# Patient Record
Sex: MALE | Race: WHITE | Employment: FULL TIME | URBAN - METROPOLITAN AREA
[De-identification: names, ages, dates, MRNs, and addresses within clinical notes are randomized per-mention and may not be internally consistent; named-entity substitution may affect disease eponyms.]

---

## 2017-04-20 ENCOUNTER — OFFICE VISIT (OUTPATIENT)
Dept: INTERNAL MEDICINE CLINIC | Age: 37
End: 2017-04-20

## 2017-04-20 VITALS
TEMPERATURE: 97.6 F | OXYGEN SATURATION: 97 % | HEIGHT: 70 IN | WEIGHT: 189 LBS | SYSTOLIC BLOOD PRESSURE: 128 MMHG | BODY MASS INDEX: 27.06 KG/M2 | DIASTOLIC BLOOD PRESSURE: 76 MMHG | HEART RATE: 72 BPM

## 2017-04-20 DIAGNOSIS — J30.9 ALLERGIC RHINOCONJUNCTIVITIS OF BOTH EYES: ICD-10-CM

## 2017-04-20 DIAGNOSIS — J45.20 MILD INTERMITTENT ASTHMA WITHOUT COMPLICATION: Primary | ICD-10-CM

## 2017-04-20 DIAGNOSIS — G47.33 OSA (OBSTRUCTIVE SLEEP APNEA): ICD-10-CM

## 2017-04-20 DIAGNOSIS — Z00.00 PREVENTATIVE HEALTH CARE: ICD-10-CM

## 2017-04-20 DIAGNOSIS — K51.919 ULCERATIVE COLITIS WITH COMPLICATION, UNSPECIFIED LOCATION (HCC): ICD-10-CM

## 2017-04-20 DIAGNOSIS — H10.13 ALLERGIC RHINOCONJUNCTIVITIS OF BOTH EYES: ICD-10-CM

## 2017-04-20 DIAGNOSIS — Z72.0 TOBACCO ABUSE DISORDER: ICD-10-CM

## 2017-04-20 PROBLEM — J30.89 PERENNIAL ALLERGIC RHINITIS: Status: ACTIVE | Noted: 2017-04-20

## 2017-04-20 LAB
ALBUMIN SERPL-MCNC: 4.6 G/DL (ref 3.4–5)
ALP BLD-CCNC: 46 U/L (ref 40–129)
ALT SERPL-CCNC: 23 U/L (ref 10–40)
ANION GAP SERPL CALCULATED.3IONS-SCNC: 12 MMOL/L (ref 3–16)
AST SERPL-CCNC: 20 U/L (ref 15–37)
BASOPHILS ABSOLUTE: 0.1 K/UL (ref 0–0.2)
BASOPHILS RELATIVE PERCENT: 1.2 %
BILIRUB SERPL-MCNC: 0.3 MG/DL (ref 0–1)
BILIRUBIN DIRECT: <0.2 MG/DL (ref 0–0.3)
BILIRUBIN, INDIRECT: NORMAL MG/DL (ref 0–1)
BUN BLDV-MCNC: 30 MG/DL (ref 7–20)
C-REACTIVE PROTEIN: <0.3 MG/L (ref 0–5.1)
CALCIUM SERPL-MCNC: 9.2 MG/DL (ref 8.3–10.6)
CHLORIDE BLD-SCNC: 102 MMOL/L (ref 99–110)
CHOLESTEROL, TOTAL: 212 MG/DL (ref 0–199)
CO2: 28 MMOL/L (ref 21–32)
CREAT SERPL-MCNC: 0.7 MG/DL (ref 0.9–1.3)
EOSINOPHILS ABSOLUTE: 0.5 K/UL (ref 0–0.6)
EOSINOPHILS RELATIVE PERCENT: 7 %
GFR AFRICAN AMERICAN: >60
GFR NON-AFRICAN AMERICAN: >60
GLUCOSE BLD-MCNC: 82 MG/DL (ref 70–99)
HCT VFR BLD CALC: 42.8 % (ref 40.5–52.5)
HDLC SERPL-MCNC: 56 MG/DL (ref 40–60)
HEMOGLOBIN: 14.1 G/DL (ref 13.5–17.5)
IRON SATURATION: 13 % (ref 20–50)
IRON: 45 UG/DL (ref 59–158)
LDL CHOLESTEROL CALCULATED: 140 MG/DL
LYMPHOCYTES ABSOLUTE: 2.1 K/UL (ref 1–5.1)
LYMPHOCYTES RELATIVE PERCENT: 31.4 %
MCH RBC QN AUTO: 28.4 PG (ref 26–34)
MCHC RBC AUTO-ENTMCNC: 33.1 G/DL (ref 31–36)
MCV RBC AUTO: 86 FL (ref 80–100)
MONOCYTES ABSOLUTE: 0.6 K/UL (ref 0–1.3)
MONOCYTES RELATIVE PERCENT: 9.1 %
NEUTROPHILS ABSOLUTE: 3.4 K/UL (ref 1.7–7.7)
NEUTROPHILS RELATIVE PERCENT: 51.3 %
PDW BLD-RTO: 14.1 % (ref 12.4–15.4)
PHOSPHORUS: 3.9 MG/DL (ref 2.5–4.9)
PLATELET # BLD: 284 K/UL (ref 135–450)
PMV BLD AUTO: 7.7 FL (ref 5–10.5)
POTASSIUM SERPL-SCNC: 4.3 MMOL/L (ref 3.5–5.1)
RBC # BLD: 4.97 M/UL (ref 4.2–5.9)
SEDIMENTATION RATE, ERYTHROCYTE: 7 MM/HR (ref 0–15)
SODIUM BLD-SCNC: 142 MMOL/L (ref 136–145)
T4 FREE: 1.1 NG/DL (ref 0.9–1.8)
TOTAL IRON BINDING CAPACITY: 342 UG/DL (ref 260–445)
TOTAL PROTEIN: 7.2 G/DL (ref 6.4–8.2)
TRIGL SERPL-MCNC: 82 MG/DL (ref 0–150)
VLDLC SERPL CALC-MCNC: 16 MG/DL
WBC # BLD: 6.7 K/UL (ref 4–11)

## 2017-04-20 PROCEDURE — 90471 IMMUNIZATION ADMIN: CPT | Performed by: INTERNAL MEDICINE

## 2017-04-20 PROCEDURE — 90732 PPSV23 VACC 2 YRS+ SUBQ/IM: CPT | Performed by: INTERNAL MEDICINE

## 2017-04-20 PROCEDURE — 99204 OFFICE O/P NEW MOD 45 MIN: CPT | Performed by: INTERNAL MEDICINE

## 2017-04-20 RX ORDER — ALBUTEROL SULFATE 90 UG/1
2 AEROSOL, METERED RESPIRATORY (INHALATION) EVERY 4 HOURS PRN
Qty: 2 INHALER | Refills: 5 | Status: SHIPPED | OUTPATIENT
Start: 2017-04-20

## 2017-04-20 RX ORDER — AZELASTINE HYDROCHLORIDE 0.5 MG/ML
1 SOLUTION/ DROPS OPHTHALMIC 2 TIMES DAILY
Qty: 6 ML | Refills: 5 | Status: SHIPPED | OUTPATIENT
Start: 2017-04-20 | End: 2018-07-31

## 2017-04-20 RX ORDER — FLUTICASONE PROPIONATE 50 MCG
1 SPRAY, SUSPENSION (ML) NASAL DAILY
Qty: 1 BOTTLE | Refills: 3 | Status: SHIPPED | OUTPATIENT
Start: 2017-04-20 | End: 2018-07-31

## 2017-04-20 RX ORDER — MELOXICAM 15 MG/1
15 TABLET ORAL DAILY PRN
Qty: 90 TABLET | Refills: 1 | Status: SHIPPED | OUTPATIENT
Start: 2017-04-20 | End: 2018-07-31

## 2017-04-20 RX ORDER — ACETAMINOPHEN 500 MG
1000 TABLET ORAL EVERY 6 HOURS PRN
Qty: 225 TABLET | Refills: 3 | Status: SHIPPED | OUTPATIENT
Start: 2017-04-20

## 2017-04-21 DIAGNOSIS — R79.0 LOW IRON STORES: Primary | ICD-10-CM

## 2017-04-21 LAB
ESTIMATED AVERAGE GLUCOSE: 114 MG/DL
FERRITIN: 27 NG/ML (ref 30–400)
HBA1C MFR BLD: 5.6 %
TSH SERPL DL<=0.05 MIU/L-ACNC: 1.32 UIU/ML (ref 0.27–4.2)

## 2017-04-21 RX ORDER — FERROUS SULFATE 300 MG/5ML
300 LIQUID (ML) ORAL 2 TIMES DAILY
Qty: 300 ML | Refills: 3 | Status: SHIPPED | OUTPATIENT
Start: 2017-04-21 | End: 2018-07-31

## 2017-06-16 ENCOUNTER — TELEPHONE (OUTPATIENT)
Dept: SLEEP MEDICINE | Age: 37
End: 2017-06-16

## 2017-07-24 ENCOUNTER — TELEPHONE (OUTPATIENT)
Dept: INTERNAL MEDICINE CLINIC | Age: 37
End: 2017-07-24

## 2017-07-28 DIAGNOSIS — D22.9 ATYPICAL MOLE: Primary | ICD-10-CM

## 2017-10-18 ENCOUNTER — OFFICE VISIT (OUTPATIENT)
Dept: INTERNAL MEDICINE CLINIC | Age: 37
End: 2017-10-18

## 2017-10-18 VITALS
RESPIRATION RATE: 16 BRPM | HEART RATE: 72 BPM | WEIGHT: 206 LBS | SYSTOLIC BLOOD PRESSURE: 114 MMHG | HEIGHT: 70 IN | BODY MASS INDEX: 29.49 KG/M2 | DIASTOLIC BLOOD PRESSURE: 76 MMHG

## 2017-10-18 DIAGNOSIS — K51.011 ULCERATIVE PANCOLITIS WITH RECTAL BLEEDING (HCC): Primary | ICD-10-CM

## 2017-10-18 DIAGNOSIS — J45.20 MILD INTERMITTENT ASTHMA WITHOUT COMPLICATION: ICD-10-CM

## 2017-10-18 PROCEDURE — 99214 OFFICE O/P EST MOD 30 MIN: CPT | Performed by: INTERNAL MEDICINE

## 2017-10-18 PROCEDURE — 90686 IIV4 VACC NO PRSV 0.5 ML IM: CPT | Performed by: INTERNAL MEDICINE

## 2017-10-18 PROCEDURE — 90471 IMMUNIZATION ADMIN: CPT | Performed by: INTERNAL MEDICINE

## 2017-10-18 RX ORDER — PREDNISONE 20 MG/1
40 TABLET ORAL DAILY
Qty: 14 TABLET | Refills: 0 | Status: SHIPPED | OUTPATIENT
Start: 2017-10-18

## 2017-10-18 RX ORDER — CIPROFLOXACIN 250 MG/1
250 TABLET, FILM COATED ORAL 2 TIMES DAILY
Qty: 14 TABLET | Refills: 0 | Status: SHIPPED | OUTPATIENT
Start: 2017-10-18 | End: 2017-10-25

## 2017-10-18 RX ORDER — METRONIDAZOLE 500 MG/1
500 TABLET ORAL 2 TIMES DAILY
Qty: 14 TABLET | Refills: 0 | Status: SHIPPED | OUTPATIENT
Start: 2017-10-18 | End: 2017-10-25

## 2017-10-18 ASSESSMENT — ENCOUNTER SYMPTOMS
RESPIRATORY NEGATIVE: 1
ABDOMINAL PAIN: 1
NAUSEA: 1
SHORTNESS OF BREATH: 0
ABDOMINAL DISTENTION: 0
DIARRHEA: 1
VOMITING: 1
COUGH: 0
BLOOD IN STOOL: 1

## 2017-10-18 NOTE — PROGRESS NOTES
Vaccine Information Sheet, \"Influenza - Inactivated\"  given to Chago Carroll, or parent/legal guardian of  Chago Carroll and verbalized understanding. Patient responses:    Have you ever had a reaction to a flu vaccine? No  Are you able to eat eggs without adverse effects? Yes  Do you have any current illness? No  Have you ever had Guillian [place] Syndrome? No    Flu vaccine given per order. Please see immunization tab.

## 2017-10-18 NOTE — PROGRESS NOTES
(Site: Left Arm, Position: Sitting, Cuff Size: Large Adult)   Pulse 72 Comment: Regular  Resp 16   Ht 5' 10\" (1.778 m)   Wt 206 lb (93.4 kg)   BMI 29.56 kg/m²     Assessment:           ICD-10-CM ICD-9-CM    1. Ulcerative pancolitis with rectal bleeding (HCC) K51.011 556.6 C DIFF TOXIN/ANTIGEN      Miscellaneous Sendout 1      TISSUE CULTURE      EIA ANTIGEN TEST STOOL #1      GI Bacterial Pathogens By PCR   2.  Mild intermittent asthma without complication I58.50 941.71       UC with acute exacerbation      Asthma well controlled       Plan:       steroid burst and antibiotics   check stools   encourage to resume the ASA and f/u with GI  Vaccinate today    Orders Placed This Encounter   Procedures    C DIFF TOXIN/ANTIGEN    TISSUE CULTURE    GI Bacterial Pathogens By PCR    INFLUENZA, QUADV, 3 YRS AND OLDER, IM, PF, PREFILL SYR OR SDV, 0.5ML (FLUZONE QUADV, PF)    Miscellaneous Sendout 1    EIA ANTIGEN TEST STOOL #1       Current Outpatient Prescriptions   Medication Sig Dispense Refill    predniSONE (DELTASONE) 20 MG tablet Take 2 tablets by mouth daily 14 tablet 0    ciprofloxacin (CIPRO) 250 MG tablet Take 1 tablet by mouth 2 times daily for 7 days 14 tablet 0    metroNIDAZOLE (FLAGYL) 500 MG tablet Take 1 tablet by mouth 2 times daily for 7 days 14 tablet 0    mesalamine (PENTASA) 250 MG extended release capsule Take 2 capsules by mouth 4 times daily 360 capsule 3    Budesonide 9 MG TB24 Take 9 mg by mouth daily 30 tablet 2    albuterol sulfate  (90 BASE) MCG/ACT inhaler Inhale 2 puffs into the lungs every 4 hours as needed for Wheezing or Shortness of Breath Use inhaler with a spacer device 2 Inhaler 5    Spacer/Aero-Holding Chambers (E-Z SPACER) SALIMA 1 Device by Does not apply route daily as needed (shortness of breath) 1 Device 0    acetaminophen (TYLENOL) 500 MG tablet Take 2 tablets by mouth every 6 hours as needed for Pain 225 tablet 3    ferrous sulfate 300 (60 FE) MG/5ML syrup Take 5 mLs by mouth 2 times daily 300 mL 3    fluticasone (FLONASE) 50 MCG/ACT nasal spray 1 spray by Nasal route daily 1 Bottle 3    azelastine (OPTIVAR) 0.05 % ophthalmic solution Place 1 drop into both eyes 2 times daily 6 mL 5    meloxicam (MOBIC) 15 MG tablet Take 1 tablet by mouth daily as needed for Pain 90 tablet 1     No current facility-administered medications for this visit.         AVS and education print provided    Og Chanel  10/18/2017

## 2017-10-19 DIAGNOSIS — K51.011 ULCERATIVE PANCOLITIS WITH RECTAL BLEEDING (HCC): ICD-10-CM

## 2017-10-19 LAB — C DIFFICILE TOXIN, EIA: NORMAL

## 2017-10-20 LAB
CRYPTOSPORIDIUM ANTIGEN STOOL: NORMAL
E HISTOLYTICA ANTIGEN STOOL: NORMAL
GI BACTERIAL PATHOGENS BY PCR: NORMAL
GIARDIA ANTIGEN STOOL: NORMAL

## 2017-10-20 RX ORDER — BUDESONIDE 3 MG/1
3 CAPSULE, COATED PELLETS ORAL 3 TIMES DAILY
Qty: 90 CAPSULE | Refills: 3 | Status: SHIPPED | OUTPATIENT
Start: 2017-10-20 | End: 2018-07-31

## 2018-04-02 ENCOUNTER — HOSPITAL ENCOUNTER (OUTPATIENT)
Dept: OTHER | Age: 38
Discharge: HOME | End: 2018-04-02
Attending: INTERNAL MEDICINE | Admitting: INTERNAL MEDICINE

## 2018-04-02 DIAGNOSIS — Z01.818 PREOP TESTING: ICD-10-CM

## 2018-07-31 ENCOUNTER — OFFICE VISIT (OUTPATIENT)
Dept: PRIMARY CARE CLINIC | Age: 38
End: 2018-07-31

## 2018-07-31 VITALS
WEIGHT: 205 LBS | BODY MASS INDEX: 29.35 KG/M2 | SYSTOLIC BLOOD PRESSURE: 106 MMHG | TEMPERATURE: 98.1 F | HEART RATE: 74 BPM | OXYGEN SATURATION: 96 % | DIASTOLIC BLOOD PRESSURE: 61 MMHG | HEIGHT: 70 IN

## 2018-07-31 DIAGNOSIS — J30.89 PERENNIAL ALLERGIC RHINITIS: ICD-10-CM

## 2018-07-31 DIAGNOSIS — F17.211 CIGARETTE NICOTINE DEPENDENCE IN REMISSION: ICD-10-CM

## 2018-07-31 DIAGNOSIS — K51.90 ULCERATIVE COLITIS WITHOUT COMPLICATIONS, UNSPECIFIED LOCATION (HCC): Primary | ICD-10-CM

## 2018-07-31 DIAGNOSIS — R73.09 ELEVATED GLUCOSE: ICD-10-CM

## 2018-07-31 LAB — HBA1C MFR BLD: 5.5 %

## 2018-07-31 PROCEDURE — 83036 HEMOGLOBIN GLYCOSYLATED A1C: CPT | Performed by: FAMILY MEDICINE

## 2018-07-31 PROCEDURE — 99203 OFFICE O/P NEW LOW 30 MIN: CPT | Performed by: FAMILY MEDICINE

## 2018-07-31 RX ORDER — CETIRIZINE HYDROCHLORIDE 10 MG/1
10 TABLET ORAL DAILY
COMMUNITY

## 2018-07-31 RX ORDER — INFLIXIMAB 100 MG/10ML
5 INJECTION, POWDER, LYOPHILIZED, FOR SOLUTION INTRAVENOUS SEE ADMIN INSTRUCTIONS
COMMUNITY

## 2018-07-31 ASSESSMENT — PATIENT HEALTH QUESTIONNAIRE - PHQ9
4. FEELING TIRED OR HAVING LITTLE ENERGY: 3
2. FEELING DOWN, DEPRESSED OR HOPELESS: 3
3. TROUBLE FALLING OR STAYING ASLEEP: 0
5. POOR APPETITE OR OVEREATING: 3
SUM OF ALL RESPONSES TO PHQ QUESTIONS 1-9: 17
6. FEELING BAD ABOUT YOURSELF - OR THAT YOU ARE A FAILURE OR HAVE LET YOURSELF OR YOUR FAMILY DOWN: 3
9. THOUGHTS THAT YOU WOULD BE BETTER OFF DEAD, OR OF HURTING YOURSELF: 0
SUM OF ALL RESPONSES TO PHQ9 QUESTIONS 1 & 2: 6
1. LITTLE INTEREST OR PLEASURE IN DOING THINGS: 3
10. IF YOU CHECKED OFF ANY PROBLEMS, HOW DIFFICULT HAVE THESE PROBLEMS MADE IT FOR YOU TO DO YOUR WORK, TAKE CARE OF THINGS AT HOME, OR GET ALONG WITH OTHER PEOPLE: 3
7. TROUBLE CONCENTRATING ON THINGS, SUCH AS READING THE NEWSPAPER OR WATCHING TELEVISION: 2
8. MOVING OR SPEAKING SO SLOWLY THAT OTHER PEOPLE COULD HAVE NOTICED. OR THE OPPOSITE, BEING SO FIGETY OR RESTLESS THAT YOU HAVE BEEN MOVING AROUND A LOT MORE THAN USUAL: 0

## 2018-07-31 ASSESSMENT — ENCOUNTER SYMPTOMS
TROUBLE SWALLOWING: 0
EYE PAIN: 0
SHORTNESS OF BREATH: 0
VOICE CHANGE: 0
ANAL BLEEDING: 0
COUGH: 0
PHOTOPHOBIA: 0
EYE ITCHING: 0
SINUS PRESSURE: 0
CHOKING: 0
EYE REDNESS: 0
CHEST TIGHTNESS: 0
NAUSEA: 0
BACK PAIN: 0
VOMITING: 0
COLOR CHANGE: 0
SORE THROAT: 0
WHEEZING: 0
APNEA: 0
ABDOMINAL PAIN: 0
CONSTIPATION: 0
FACIAL SWELLING: 0
RECTAL PAIN: 0
BLOOD IN STOOL: 0
EYE DISCHARGE: 0

## 2018-07-31 NOTE — PROGRESS NOTES
Subjective:      Patient ID: Holly Mckeon is a 45 y.o. male. The chief complaint(s)  New pt for for UC, hay fever, elevated glucose  HPI40 y/o male new to practice. Known UC for 3 years presented with diarrhea onset. Currently on prednisone and remicade . No current diarrhea, wt loss, diarrhea, fever, appetite change. No fever. No cough. No assoc sxs  No current aggravating factors  Last colonoscopy 2 months ago . GI Dr Flaco Cooper, Dr Kasi Montalvo        Review of Systems   Constitutional: Negative for activity change, appetite change, chills, diaphoresis, fatigue, fever and unexpected weight change. HENT: Negative for congestion, dental problem, drooling, ear discharge, ear pain, facial swelling, hearing loss, mouth sores, nosebleeds, postnasal drip, sinus pressure, sneezing, sore throat, tinnitus, trouble swallowing and voice change. Eyes: Negative for photophobia, pain, discharge, redness, itching and visual disturbance. Respiratory: Negative for apnea, cough, choking, chest tightness, shortness of breath and wheezing. Cardiovascular: Negative for chest pain, palpitations and leg swelling. Gastrointestinal: Negative for abdominal pain, anal bleeding, blood in stool, constipation, nausea, rectal pain and vomiting. Genitourinary: Negative for decreased urine volume, difficulty urinating, discharge, dysuria, enuresis, flank pain, frequency, hematuria, penile swelling, scrotal swelling, testicular pain and urgency. Musculoskeletal: Negative for arthralgias, back pain, gait problem, joint swelling, myalgias, neck pain and neck stiffness. Skin: Negative for color change, pallor, rash and wound. Neurological: Negative for dizziness, tremors, seizures, syncope, facial asymmetry, speech difficulty, weakness, light-headedness, numbness and headaches. Hematological: Negative for adenopathy. Does not bruise/bleed easily.    Psychiatric/Behavioral: Negative for agitation, behavioral problems, confusion, decreased concentration, dysphoric mood, hallucinations, self-injury, sleep disturbance and suicidal ideas. The patient is not nervous/anxious and is not hyperactive. Objective:   Physical Exam   Constitutional: He is oriented to person, place, and time. He appears well-developed and well-nourished. No distress. HENT:   Head: Normocephalic and atraumatic. Right Ear: External ear normal.   Left Ear: External ear normal.   Nose: Nose normal.   Mouth/Throat: Oropharynx is clear and moist. No oropharyngeal exudate. Eyes: Conjunctivae and EOM are normal. Pupils are equal, round, and reactive to light. Right eye exhibits no discharge. Left eye exhibits no discharge. No scleral icterus. Neck: Normal range of motion. Neck supple. No JVD present. No tracheal deviation present. No thyromegaly present. Cardiovascular: Normal rate, regular rhythm, normal heart sounds and intact distal pulses. Exam reveals no friction rub. No murmur heard. Pulses:       Carotid pulses are 2+ on the right side, and 2+ on the left side. Radial pulses are 2+ on the right side, and 2+ on the left side. Femoral pulses are 2+ on the right side, and 2+ on the left side. Popliteal pulses are 2+ on the right side, and 2+ on the left side. Dorsalis pedis pulses are 2+ on the right side, and 2+ on the left side. Posterior tibial pulses are 2+ on the right side, and 2+ on the left side. Pulmonary/Chest: Effort normal and breath sounds normal. No stridor. No respiratory distress. He has no wheezes. He has no rales. He exhibits no tenderness. Abdominal: Soft. Bowel sounds are normal. He exhibits no distension and no mass. There is no tenderness. There is no rebound and no guarding. Musculoskeletal: Normal range of motion. He exhibits no edema or tenderness. Lymphadenopathy:     He has no cervical adenopathy. Neurological: He is oriented to person, place, and time. He displays normal reflexes.  No cranial nerve deficit. He exhibits normal muscle tone. Coordination normal.   Skin: Skin is warm and dry. No rash noted. He is not diaphoretic. No pallor. Psychiatric: He has a normal mood and affect. His behavior is normal. Judgment and thought content normal.   Nursing note and vitals reviewed. Assessment:      1. Ulcerative colitis without complications, unspecified location ([address]  Seems stable with prednisone 20 mg , remicade  Say Dr Laith Buchanan  8/13/19  Plans to taper by 1 mg a week  Tolerating  Xeljanc  5 mg bid [facility] 3/19  Update colonoscopy       2. Perennial allergic rhinitis  Stable with cetrizine    3. Cigarette nicotine dependence in remission  The patient is currently a smoker. Risks of smoking and second hand smoking risks discussed  With patient. Products available for smoking cessation have been discussed with patient. Says UC worse when he tries to stop    4.  Elevated glucose    - POCT glycosylated hemoglobin (Hb A1C)          Plan:      2/18/19    Seen notes from Dr Laith Buchanan MD  Dated  2/15/19 on pred 17.5 mg a day up to 10 loose bm when taking <.  Likely will start Sisi Sher

## 2018-10-11 ENCOUNTER — TELEPHONE (OUTPATIENT)
Dept: PRIMARY CARE CLINIC | Age: 38
End: 2018-10-11

## 2018-10-12 DIAGNOSIS — K51.919 ULCERATIVE COLITIS WITH COMPLICATION, UNSPECIFIED LOCATION (HCC): Primary | ICD-10-CM

## 2022-03-19 ENCOUNTER — OFFICE VISIT (OUTPATIENT)
Dept: ORTHOPEDIC SURGERY | Age: 42
End: 2022-03-19

## 2022-03-19 DIAGNOSIS — M79.662 PAIN OF LEFT CALF: Primary | ICD-10-CM

## 2022-03-19 PROCEDURE — 99203 OFFICE O/P NEW LOW 30 MIN: CPT | Performed by: STUDENT IN AN ORGANIZED HEALTH CARE EDUCATION/TRAINING PROGRAM

## 2022-03-19 NOTE — PROGRESS NOTES
CHIEF COMPLAINT: Left ankle pain. DATE OF INJURY: 3/17/22    History:  Mr. Randolph Seip 39 y.o. male presents today to after hours clinic for evaluation of a left calf injury. The injury occurred 2 days ago when he was jumping over a wave in [place]. When he landed he felt immediate calf pain. He is complaining of medial gastrocnemius pain. There is no pain at the achilles. He has not yet been evaluated for the pain. He [place] the airport as he can hardly bear weight on the left leg. He rates pain 9/10. Pain increases with walking and decreases with rest and elevation. No numbness or tingling sensation, and no other complaints. He has been using tylenol for pain control as he cannot take NSAID's due to history of ulcerative colitis. He presents today non weight bearing on crutches. Past Medical History:   Diagnosis Date    Asthma     UC (ulcerative colitis confined to rectum) ([facility] [address]        No past surgical history on file. Current Outpatient Medications on File Prior to Visit   Medication Sig Dispense Refill    inFLIXimab (REMICADE) 100 MG injection Infuse 5 mg/kg intravenously See Admin Instructions      cetirizine (ZYRTEC) 10 MG tablet Take 10 mg by mouth daily      predniSONE (DELTASONE) 20 MG tablet Take 2 tablets by mouth daily 14 tablet 0    albuterol sulfate  (90 BASE) MCG/ACT inhaler Inhale 2 puffs into the lungs every 4 hours as needed for Wheezing or Shortness of Breath Use inhaler with a spacer device 2 Inhaler 5    acetaminophen (TYLENOL) 500 MG tablet Take 2 tablets by mouth every 6 hours as needed for Pain 225 tablet 3     No current facility-administered medications on file prior to visit.        Allergies   Allergen Reactions    Naproxen     Penicillins Other (See Comments)    Zithromax [Azithromycin]        Social History     Socioeconomic History    Marital status:      Spouse name: Not on file    Number of children: Not on file    Years of education: Not on file    Highest education level: Not on file   Occupational History    Not on file   Tobacco Use    Smoking status: Current Every Day Smoker     Packs/day: 1.00     Years: 15.00     Pack years: 15.00     Types: Cigarettes, E-Cigarettes    Smokeless tobacco: Never Used   Substance and Sexual Activity    Alcohol use: Yes     Comment: rarely    Drug use: Yes     Comment: 7yrs ago or more    Sexual activity: Yes     Partners: Female     Birth control/protection: Pill   Other Topics Concern    Not on file   Social History Narrative    Not on file     Social Determinants of Health     Financial Resource Strain:     Difficulty of Paying Living Expenses: Not on file   Food Insecurity:     Worried About Running Out of Food in the Last Year: Not on file    Halle of Food in the Last Year: Not on file   Transportation Needs:     Lack of Transportation (Medical): Not on file    Lack of Transportation (Non-Medical): Not on file   Physical Activity:     Days of Exercise per Week: Not on file    Minutes of Exercise per Session: Not on file   Stress:     Feeling of Stress : Not on file   Social Connections:     Frequency of Communication with Friends and Family: Not on file    Frequency of Social Gatherings with Friends and Family: Not on file    Attends Sikhism Services: Not on file    Active Member of [address] [place] [address] [employer] or Organizations: Not on file    Attends Club or Organization Meetings: Not on file    Marital Status: Not on file   Intimate Partner Violence:     Fear of Current or Ex-Partner: Not on file    Emotionally Abused: Not on file    Physically Abused: Not on file    Sexually Abused: Not on file   Housing Stability:     Unable to Pay for Housing in the Last Year: Not on file    Number of [place] in the Last Year: Not on file    Unstable Housing in the Last Year: Not on file       No family history on file.     Review of Systems:  I have reviewed the clinically relevant past medical history, medications, allergies, family history, social history, and 13 point Review of Systems from the patient's recent history form & documented any details relevant to today's presenting complaints in the history above. The patient's self-reported past medical history, medications, allergies, family history, social history, and Review of Systems form from 3/19/22 have been scanned into the chart under the \"Media\" tab. Physical Examination:  Mr. Sharron Myers is a 39 y.o. male who presents today in no acute distress, awake, alert, and oriented. Examination of left lower extremity demonstrates no obvious deformity. Left ankle ROM 20 (dorsiflexion) - 40 (plantarflexion), right ankle 20-50. There is minimal swelling that can be seen in left medial gastrocnemius, no swelling at the ankle. No change in the color left ankle. He has intact sensation to light touch throughout the foot and good pedal pulses bilaterally. He has good strength in dorsiflexion, eversion, inversion. Plantarflexion strength is decreased. There is discomfort with palpation at the medial gastrocnemius. There is a palpable defect at the medial gastrocnemius. Cristobal test is positive on the left ankle, and negative on the right ankle. Matles sign is negative on the left ankle, and negative on the right ankle. IMAGIN views of the Left tibia fibula demonstrate no acute abnormality or fracture. There is lucency in the soft tissue of the gastrocnemius that correlates to the defect on physical exam.        Impression:   Left medial gastrocnemius partial vs complete tear      Plan:   The diagnosis was discussed with the patient. Given the mechanism of injury, palpable defect on exam of the medial gastrocnemius and weakness on exam, I feel that an MRI of the left calf is warranted to evaluate for tear of the medial gastrocnemius. Patient will continue with non weight bearing on crutches. Ice as needed. Follow up with me or Dr. Eduardo Perez after MRI. Nava Ellis PA-C  Board Certified by the [employer] on Certification of [address] [facility] and [facility]ics       Disclaimer: This note was generated with use of a verbal recognition program and an attempt was made to check for errors. It is possible that there are still dictated errors within this office note. If so, please bring any significant errors to my attention for an addendum. All efforts were made to ensure that this office note is accurate.

## 2022-03-22 ENCOUNTER — TELEPHONE (OUTPATIENT)
Dept: ORTHOPEDIC SURGERY | Age: 42
End: 2022-03-22

## 2022-03-22 DIAGNOSIS — M79.662 PAIN OF LEFT CALF: Primary | ICD-10-CM

## 2022-03-22 NOTE — TELEPHONE ENCOUNTER
LVM MRI denied (reasoning not medically necessary). Patient can start physical therapy. If no improvement after 6 weeks we can resubmit for MRI.

## 2022-03-23 ENCOUNTER — TELEPHONE (OUTPATIENT)
Dept: ORTHOPEDIC SURGERY | Age: 42
End: 2022-03-23

## 2022-03-23 NOTE — TELEPHONE ENCOUNTER
[facility] [place] Disability parking placard for the following reasons: He has limited walking ability due to an orthopedic condition. Duration of need: 6 months  Placed in patient chart      Patient can start physical therapy.  If no improvement after 6 weeks we can resubmit for MR    [address] [facility] Therapy   [address] [facility] [address] [facility] , [address]   Call to schedule: [phone]   Fax: [phone]

## 2022-03-23 NOTE — LETTER
[facility]  [address] [facility] [address]  Phone: [phone]  Fax: [phone]    Calin Pierce         March 23, 2022     Patient: Caitlyn Tanner   YOB: 1980   Date of Visit: 3/23/2022       To Whom It May Concern: It is my medical opinion that Caitlyn Tanner requires a disability parking placard for the following reasons:  He has limited walking ability due to an orthopedic condition. Duration of need: 6 months    If you have any questions or concerns, please don't hesitate to call.     Sincerely,        BILL Pierce

## 2022-03-23 NOTE — TELEPHONE ENCOUNTER
General Question     Subject: LT CALF  Patient and /or Facility Request: Phil Rebecca Number: [phone]    PATIENT CALLING REGARDING HE WAS IN AFTER HOURS ON Saturday, MARCH 19, 2022. PATIENT STATED THAT HE HAS A LEFT CALF MUSCLE TEAR. PATIENT WOULD LIKE TO KNOW IF HE CAN GET A HANDICAP PLACARD. PLEASE CALL BACK PATIENT AT THE ABOVE NUMBER.

## 2022-03-24 ENCOUNTER — HOSPITAL ENCOUNTER (OUTPATIENT)
Dept: PHYSICAL THERAPY | Age: 42
Setting detail: THERAPIES SERIES
Discharge: HOME | End: 2022-03-24
Payer: COMMERCIAL

## 2022-03-24 PROCEDURE — 97530 THERAPEUTIC ACTIVITIES: CPT | Performed by: PHYSICAL THERAPIST

## 2022-03-24 PROCEDURE — 97161 PT EVAL LOW COMPLEX 20 MIN: CPT | Performed by: PHYSICAL THERAPIST

## 2022-03-24 NOTE — PLAN OF CARE
[place], [address] [facility], [address]  Phone: [phone]   Fax: [phone]                                                       Physical Therapy Certification    Dear Referring Practitioner: BILL Vance,    We had the pleasure of evaluating the following patient for physical therapy services at [address] [place] [address]. A summary of our findings can be found in the initial assessment below. This includes our plan of care. If you have any questions or concerns regarding these findings, please do not hesitate to contact me at the office phone number checked above. Thank you for the referral.       Physician Signature:_______________________________Date:__________________  By signing above (or electronic signature), therapists plan is approved by physician      Patient: Nayla Barrientos   : 1980   MRN: [id number]  Referring Physician: Referring Practitioner: BILL Vance      Evaluation Date: 3/24/2022      Medical Diagnosis Information:  Diagnosis: B92.178 (ICD-10-CM) - Pain of left calf   Treatment Diagnosis: Impairments of L calf pain, LLE strength, L knee and ankle ROM, gait, balance, and proprioception                                         Insurance information: PT Insurance Information: Med [place]     Precautions/ Contra-indications: none  Latex Allergy:  [x]NO      []YES  Preferred Language for Healthcare:   [x]English       []Other:    C-SSRS Triggered by Intake questionnaire (Past 2 wk assessment ):   [x] No, Questionnaire did not trigger screening.   [] Yes, Patient intake triggered C-SSRS Screening     [] Completed, no further action required.    [] Completed, PCP notified via Epic    SUBJECTIVE: Patient stated complaint: Pt is a 40 yo male presenting with L calf pain; states he was walking into the ocean and tried to jump over a wave, Hamstrings 4+ 4+   Ankle Dorsiflexion (L4-5) 5 5   Ankle Plantarflexion (S1-2)     Ankle Inversion     Ankle Eversion (S1-2) 5 5   Great Toe Extension (L5) 5 5            Joint mobility:    [x]Normal    []Hypo   []Hyper    Orthopaedic Special Tests Positive  Negative  NT Comments           Ankle       Anterior Drawer   x    Talar Tilt   x    Cristobal  x     Virginie's    x                                  [x] Patient history, allergies, meds reviewed. Medical chart reviewed. See intake form. Review Of Systems (ROS):  [x]Performed Review of systems (Integumentary, CardioPulmonary, Neurological) by intake and observation. Intake form has been scanned into medical record. Patient has been instructed to contact their primary care physician regarding ROS issues if not already being addressed at this time.       Co-morbidities/Complexities (which will affect course of rehabilitation):   []None        []Hx of COVID   Arthritic conditions   []Rheumatoid arthritis (M05.9)  []Osteoarthritis (M19.91)  []Gout   Cardiovascular conditions   []Hypertension (I10)  []Hyperlipidemia (E78.5)  []Angina pectoris (I20)  []Atherosclerosis (I70)  []Pacemaker  []Hx of CABG/stent/  cardiac surgeries   Musculoskeletal conditions   []Disc pathology   []Congenital spine pathologies   []Osteoporosis (M81.8)  []Osteopenia (M85.8)  []Scoliosis       Endocrine conditions   []Hypothyroid (E03.9)  []Hyperthyroid Gastrointestinal conditions   []Constipation (J22.75)   Metabolic conditions   []Morbid obesity (E66.01)  []Diabetes type 1(E10.65) or 2 (E11.65)   []Neuropathy (G60.9)     Cardio/Pulmonary conditions   []Asthma (J45)  []Coughing   []COPD (J44.9)  []CHF  []A-fib   Psychological Disorders  [x]Anxiety (F41.9)  [x]Depression (F32.9)   []Other:   Developmental Disorders  []Autism (F84.0)  []CP (G80)  []Down Syndrome (Q90.9)  []Developmental delay     Neurological conditions  []Prior Stroke (I69.30)  []Parkinson's (G20)  []Encephalopathy (G93.40)  []MS (G35)  []Post-polio (G14)  []SCI  []TBI  []ALS Other conditions  []Fibromyalgia (M79.7)  []Vertigo  []Syncope  []Kidney Failure  []Cancer      []currently undergoing                treatment  []Pregnancy  []Incontinence   Prior surgeries  []involved limb  []previous spinal surgery  [] section birth  []hysterectomy  []bowel / bladder surgery  []other relevant surgeries   []Other:              Barriers to/and or personal factors that will affect rehab potential:              [x]Age  []Sex    []Smoker              []Motivation/Lack of Motivation                        []Co-Morbidities              []Cognitive Function, education/learning barriers              [x]Environmental, home barriers              [x]profession/work barriers  []past PT/medical experience  []other:  Justification:     Falls Risk Assessment (30 days):   [x] Falls Risk assessed and no intervention required.   [] Falls Risk assessed and Patient requires intervention due to being higher risk   TUG score (>12s at risk):     [] Falls education provided, including        ASSESSMENT:   Functional Impairments:     []Noted lumbar/proximal hip/LE joint hypomobility   [x]Decreased LE functional ROM   [x]Decreased core/proximal hip strength and neuromuscular control   [x]Decreased LE functional strength   [x]Reduced balance/proprioceptive control   []other:      Functional Activity Limitations (from functional questionnaire and intake)   [x]Reduced ability to tolerate prolonged functional positions   [x]Reduced ability or difficulty with changes of positions or transfers between positions   [x]Reduced ability to maintain good posture and demonstrate good body mechanics with sitting, bending, and lifting   [x]Reduced ability to sleep   [x] Reduced ability or tolerance with driving and/or computer work   [x]Reduced ability to perform lifting, carrying tasks   [x]Reduced ability to squat   [x]Reduced ability to forward bend   [x]Reduced ability to ambulate prolonged functional periods/distances/surfaces   [x]Reduced ability to ascend/descend stairs   [x]Reduced ability to run, hop, cut or jump   []other:    Participation Restrictions   [x]Reduced participation in self care activities   [x]Reduced participation in home management activities   [x]Reduced participation in work activities   [x]Reduced participation in social activities. [x]Reduced participation in sport/recreation activities. Classification :    []Signs/symptoms consistent with post-surgical status including decreased ROM, strength and function.    [x]Signs/symptoms consistent with joint sprain/strain   []Signs/symptoms consistent with patella-femoral syndrome   []Signs/symptoms consistent with knee OA/hip OA   []Signs/symptoms consistent with internal derangement of knee/Hip   []Signs/symptoms consistent with functional hip weakness/NMR control      []Signs/symptoms consistent with tendinitis/tendinosis    []signs/symptoms consistent with pathology which may benefit from Dry needling      []other:      Prognosis/Rehab Potential:      []Excellent   [x]Good    []Fair   []Poor    Tolerance of evaluation/treatment:    []Excellent   [x]Good    []Fair   []Poor    Physical Therapy Evaluation Complexity Justification  [x] A history of present problem with:  [] no personal factors and/or comorbidities that impact the plan of care;  [x]1-2 personal factors and/or comorbidities that impact the plan of care  []3 personal factors and/or comorbidities that impact the plan of care  [x] An examination of body systems using standardized tests and measures addressing any of the following: body structures and functions (impairments), activity limitations, and/or participation restrictions;:  [] a total of 1-2 or more elements   [] a total of 3 or more elements   [x] a total of 4 or more elements   [x] A clinical presentation with:  [x] stable and/or uncomplicated characteristics   [] evolving clinical presentation with changing characteristics  [] unstable and unpredictable characteristics;   [x] Clinical decision making of [x] Low, [] moderate, [] high complexity using standardized patient assessment instrument and/or measurable assessment of functional outcome. [x] EVAL (LOW) 55718 (typically 15 minutes face-to-face)  [] EVAL (MOD) 21603 (typically 30 minutes face-to-face)  [] EVAL (HIGH) 52934 (typically 45 minutes face-to-face)  [] RE-EVAL     PLAN:   Frequency/Duration:  1-2 days per week for 10-12 Weeks:  Interventions:  [x]  Therapeutic exercise including: strength training, ROM, for Lower extremity and core   [x]  NMR activation and proprioception for LE, Glutes and Core   [x]  Manual therapy as indicated for LE, Hip and spine to include: Dry Needling/IASTM, STM, PROM, Gr I-IV mobilizations, manipulation. [x] Modalities as needed that may include: thermal agents, E-stim, Biofeedback, US, iontophoresis as indicated  [x] Patient education on joint protection, postural re-education, activity modification, progression of HEP. HEP instruction: Written HEP instructions provided and reviewed. GOALS:  Patient stated goal: return to jogging  [] Progressing: [] Met: [] Not Met: [] Adjusted    Therapist goals for Patient:   Short Term Goals: To be achieved in: 2 weeks  1. Independent in HEP and progression per patient tolerance, in order to prevent re-injury. [] Progressing: [] Met: [] Not Met: [] Adjusted  2. Patient will have a decrease in pain to facilitate improvement in movement, function, and ADLs as indicated by Functional Deficits. [] Progressing: [] Met: [] Not Met: [] Adjusted    Long Term Goals: To be achieved in: 10-12 weeks  1. FOTO raw score of 70 or greater to assist with reaching prior level of function. [] Progressing: [] Met: [] Not Met: [] Adjusted  2.  Patient will demonstrate increased L ankle dorsiflexion AROM to 12 to allow for proper joint functioning as indicated by patients Functional Deficits. [] Progressing: [] Met: [] Not Met: [] Adjusted  3. Patient will demonstrate an increase in global LLE Strength to at least 4+/5 as well as good proximal hip strength and control to allow for proper functional mobility as indicated by patients Functional Deficits. [] Progressing: [] Met: [] Not Met: [] Adjusted  4. Patient will return to functional activities including ambulating with normalized gait pattern without crutches without increased symptoms or restriction. [] Progressing: [] Met: [] Not Met: [] Adjusted  5. Patient will begin return to jogging progression demonstrating ability to jog 20 minutes continuously without an increase in symptoms. [] Progressing: [] Met: [] Not Met: [] Adjusted     Electronically signed by:  GIUSEPPE Farris SPT  Therapist was present, directed the patient's care, made skilled judgement, and was responsible for assessment and treatment of the patient.

## 2022-03-24 NOTE — FLOWSHEET NOTE
[facility]  Phone: [phone]   Fax: [phone]    Physical Therapy Treatment Note/ Progress Report:     Date:  3/24/2022    Patient Name:  Kiersten Hanson    :  1980  MRN: [id number]  Restrictions/Precautions:    Medical/Treatment Diagnosis Information:  Diagnosis: M79.662 (ICD-10-CM) - Pain of left calf  Treatment Diagnosis: Impairments of L calf pain, LLE strength, L knee and ankle ROM, gait, balance, and proprioception  Insurance/Certification information:  PT Insurance Information: [employer] [place]  Physician Information:  Referring Practitioner: BILL Hudson  Plan of care signed (Y/N): []  Yes [x]  No     Date of Patient follow up with Physician:      Progress Report: []  Yes  [x]  No     Date Range for reporting period:  Beginning: 3/24/22  Ending:     Progress report due (10 Rx/or 30 days whichever is less): visit #10 or  (date)     Recertification due (POC duration/ or 90 days whichever is less): visit #24 or 22 (date)     Visit # Insurance Allowable Auth required?  Date Range   1 20 []  Yes  [x]  No        Latex Allergy:  [x]NO      []YES  Preferred Language for Healthcare:   [x]English       []other:    Functional Scale:        Date assessed:  FOTO: raw score = 31; risk adjusted = 53   3/24/22    Pain level:  2/10; 8/10 at worst     SUBJECTIVE:  See eval    OBJECTIVE: See eval      RESTRICTIONS/PRECAUTIONS: ambulating with single crutch    Exercises/Interventions:     Therapeutic Exercise (66011)  Resistance / level Sets/sec Reps Notes / Cues   Dorsiflexion stretch  10\" 10    Seated heel raises  1 15 Instructed to perform within painfree range                                                           Therapeutic Activities (39488)       Education provided for crutch fitting and ambulating with crutches  10'                          Neuromuscular Re-ed (04241)                            Manual Intervention ([id number]) Knee mobs/PROM       Tib/Fem Mobs       Patella Mobs       Ankle mobs                         Modalities:     Pt. Education:  -pt educated on diagnosis, prognosis and expectations for rehab  -all pt questions were answered    Home Exercise Program:  Access Code: [facility]  URL: [email or web address]  Date: 03/24/2022  Prepared by: Naldo Cantu    Exercises  Seated Heel Raise - 2-3 x daily - 7 x weekly - 3 sets - 10 reps  Long Sitting Calf Stretch with Strap - 2-3 x daily - 7 x weekly - 3 sets - 10 reps  Sidelying Hip Abduction - 2-3 x daily - 7 x weekly - 3 sets - 10 reps  Standing Quad Set - 2-3 x daily - 7 x weekly - 3 sets - 10 reps  Seated Hamstring Stretch - 2-3 x daily - 7 x weekly - 3 sets - 10 reps      Therapeutic Exercise and NMR EXR  [] (54562) Provided verbal/tactile cueing for activities related to strengthening, flexibility, endurance, ROM for improvements in LE, proximal hip, and core control with self care, mobility, lifting, ambulation.  [] (07573) Provided verbal/tactile cueing for activities related to improving balance, coordination, kinesthetic sense, posture, motor skill, proprioception  to assist with LE, proximal hip, and core control in self care, mobility, lifting, ambulation and eccentric single leg control.   [] (67926) Therapist is in constant attendance of 2 or more patients providing skilled therapy interventions, but not providing any significant amount of measurable one-on-one time to either patient, for improvements in LE, proximal hip, and core control in self care, mobility, lifting, ambulation and eccentric single leg control.      NMR and Therapeutic Activities:    [x] (71142 or 63093) Provided verbal/tactile cueing for activities related to improving balance, coordination, kinesthetic sense, posture, motor skill, proprioception and motor activation to allow for proper function of core, proximal hip and LE with self care and ADLs  [] (23202) Gait Re-education- Provided training and instruction to the patient for proper LE, core and proximal hip recruitment and positioning and eccentric body weight control with ambulation re-education including up and down stairs     Home Exercise Program:    [x] (53254) Reviewed/Progressed HEP activities related to strengthening, flexibility, endurance, ROM of core, proximal hip and LE for functional self-care, mobility, lifting and ambulation/stair navigation   [] (46829)Reviewed/Progressed HEP activities related to improving balance, coordination, kinesthetic sense, posture, motor skill, proprioception of core, proximal hip and LE for self care, mobility, lifting, and ambulation/stair navigation      Manual Treatments:  PROM / STM / Oscillations-Mobs:  G-I, II, III, IV (PA's, Inf., Post.)  [] (37219) Provided manual therapy to mobilize LE, proximal hip and/or LS spine soft tissue/joints for the purpose of modulating pain, promoting relaxation,  increasing ROM, reducing/eliminating soft tissue swelling/inflammation/restriction, improving soft tissue extensibility and allowing for proper ROM for normal function with self care, mobility, lifting and ambulation. Modalities:  [] (06760) Vasopneumatic compression: Utilized vasopneumatic compression to decrease edema / swelling for the purpose of improving mobility and quad tone / recruitment which will allow for increased overall function including but not limited to self-care, transfers, ambulation, and ascending / descending stairs.        Charges:  Timed Code Treatment Minutes: 15   Total Treatment Minutes: 45     [x] EVAL - LOW (19969)   [] EVAL - MOD (64044)  [] EVAL - HIGH (61110)  [] RE-EVAL (21264)   [] JL(99989) x       [] Ionto  [] NMR (87317) x       [] Vaso  [] Manual (76128) x       [] Ultrasound  [x] TA x 1       [] Mech Traction (82019)  [] Aquatic Therapy x      [] ES (un) (98657):   [] Home Management Training x  [] ES(attended) (92065)   [] Dry Needling 1-2 muscles (34043):  [] Dry Needling 3+ muscles (589400  [] Group:      [] Other:     GOALS:  Patient stated goal: return to jogging  [] Progressing: [] Met: [] Not Met: [] Adjusted    Therapist goals for Patient:   Short Term Goals: To be achieved in: 2 weeks  1. Independent in HEP and progression per patient tolerance, in order to prevent re-injury. [] Progressing: [] Met: [] Not Met: [] Adjusted  2. Patient will have a decrease in pain to facilitate improvement in movement, function, and ADLs as indicated by Functional Deficits. [] Progressing: [] Met: [] Not Met: [] Adjusted    Long Term Goals: To be achieved in: 10-12 weeks  1. FOTO raw score of 70 or greater to assist with reaching prior level of function. [] Progressing: [] Met: [] Not Met: [] Adjusted  2. Patient will demonstrate increased L ankle dorsiflexion AROM to 12 to allow for proper joint functioning as indicated by patients Functional Deficits. [] Progressing: [] Met: [] Not Met: [] Adjusted  3. Patient will demonstrate an increase in global LLE Strength to at least 4+/5 as well as good proximal hip strength and control to allow for proper functional mobility as indicated by patients Functional Deficits. [] Progressing: [] Met: [] Not Met: [] Adjusted  4. Patient will return to functional activities including ambulating with normalized gait pattern without crutches without increased symptoms or restriction. [] Progressing: [] Met: [] Not Met: [] Adjusted  5. Patient will begin return to jogging progression demonstrating ability to jog 20 minutes continuously without an increase in symptoms. [] Progressing: [] Met: [] Not Met: [] Adjusted     Overall Progression Towards Functional goals/ Treatment Progress Update:  [] Patient is progressing as expected towards functional goals listed. [] Progression is slowed due to complexities/Impairments listed. [] Progression has been slowed due to co-morbidities.   [x] Plan just implemented, too soon to assess goals progression <30days   [] Goals require adjustment due to lack of progress  [] Patient is not progressing as expected and requires additional follow up with physician  [] Other    Persisting Functional Limitations/Impairments:  [x]Sitting [x]Standing   [x]Walking [x]Stairs   [x]Transfers [x]ADLs   [x]Squatting/bending [x]Kneeling  [x]Housework [x]Job related tasks  []Driving [x]Sports/Recreation   [x]Sleeping []Other:    ASSESSMENT:  See eval  Treatment/Activity Tolerance:  [] Pt able to complete treatment [] Patient limited by fatique  [x] Patient limited by pain  [] Patient limited by other medical complications  [] Other:     Prognosis:  [x] Good [] Fair  [] Poor    Patient Requires Follow-up: [x] Yes  [] No    Return to Play:    [x]  N/A   []  Stage 1: Intro to Strength   []  Stage 2: Return to Run and Strength   []  Stage 3: Return to Jump and Strength   []  Stage 4: Dynamic Strength and Agility   []  Stage 5: Sport Specific Training     []  Ready to Return to Play, Meets All Above Stages   []  Not Ready for Return to Sports   Comments:            PLAN: See eval. PT 1-2x / week for 10-12 weeks. [] Continue per plan of care [] Alter current plan (see comments)  [x] Plan of care initiated [] Hold pending MD visit [] Discharge    Electronically signed by: Nael Rodríguez, PT, DPT    IRWIN Traore    Note: If patient does not return for scheduled/ recommended follow up visits, this note will serve as a discharge from care along with most recent update on progress.

## 2022-03-30 ENCOUNTER — HOSPITAL ENCOUNTER (OUTPATIENT)
Dept: PHYSICAL THERAPY | Age: 42
Setting detail: THERAPIES SERIES
Discharge: HOME | End: 2022-03-30
Payer: COMMERCIAL

## 2022-03-30 PROCEDURE — 97112 NEUROMUSCULAR REEDUCATION: CPT

## 2022-03-30 PROCEDURE — 97110 THERAPEUTIC EXERCISES: CPT

## 2022-03-30 NOTE — FLOWSHEET NOTE
[facility]  Phone: [phone]   Fax: [phone]    Physical Therapy Treatment Note/ Progress Report:     Date:  3/30/2022    Patient Name:  Gloria Hendrickson    :  1980  MRN: [id number]  Restrictions/Precautions:    Medical/Treatment Diagnosis Information:  Diagnosis: M79.662 (ICD-10-CM) - Pain of left calf  Treatment Diagnosis: Impairments of L calf pain, LLE strength, L knee and ankle ROM, gait, balance, and proprioception  Insurance/Certification information:  PT Insurance Information: [employer] [place]  Physician Information:  Referring Practitioner: BILL Seo  Plan of care signed (Y/N): []  Yes [x]  No     Date of Patient follow up with Physician:      Progress Report: []  Yes  [x]  No     Date Range for reporting period:  Beginning: 3/24/22  Ending:     Progress report due (10 Rx/or 30 days whichever is less): visit #10 or  (date)     Recertification due (POC duration/ or 90 days whichever is less): visit #24 or 22 (date)     Visit # Insurance Allowable Auth required? Date Range   2 20 []  Yes  [x]  No        Latex Allergy:  [x]NO      []YES  Preferred Language for Healthcare:   [x]English       []other:    Functional Scale:        Date assessed:  FOTO: raw score = 31; risk adjusted = 53   3/24/22    Pain level: 3/10;      SUBJECTIVE:  Pt reports good response to initial visit. Notes good compliance with HEP. Pt rates L gastroc pain 3-4/10 currently. OBJECTIVE: Added ex's emphasizing L LE flexibility, strength, endurance, balance, stability and controlled LE mobility.         PROM AROM     L R L R   Knee Flexion     120 132   Knee Extension     0 0   Dorsiflexion      -2 16   Plantarflexion  50     48   Inversion      38 38   Eversion      12 14           RESTRICTIONS/PRECAUTIONS: ambulating with single crutch    Exercises/Interventions:     Therapeutic Exercise (34042)  Resistance / level Sets/sec Reps Notes / Cues Dorsiflexion stretch  10\" 10    Seated heel/toe raises  3 10ea. Instructed to perform within painfree range   Strap calf stretch  3 30\"    SL hip abd  3 10    Long sit HS stretch  3 30\"    Standing Quad set  1/5\"hold 20    Bike  5min     Runner stretch  3 30\"                  Therapeutic Activities (94561)       Education provided for crutch fitting and ambulating with crutches                            Neuromuscular Re-ed (27393)       Standing March  2 10    BAPS (L2) standing PWB: s-s/f-b/CW/CCW  1 20ea. Manual Intervention ([id number])       Knee mobs/PROM       Tib/Fem Mobs       Patella Mobs       Ankle mobs                         Modalities:     Pt. Education:  -pt educated on diagnosis, prognosis and expectations for rehab  -all pt questions were answered    Home Exercise Program:  Access Code: [email or web address]  URL: [email or web address]  Date: 03/24/2022  Prepared by: Nevaeh Sutherland    Exercises  Seated Heel Raise - 2-3 x daily - 7 x weekly - 3 sets - 10 reps  Long Sitting Calf Stretch with Strap - 2-3 x daily - 7 x weekly - 3 sets - 10 reps  Sidelying Hip Abduction - 2-3 x daily - 7 x weekly - 3 sets - 10 reps  Standing Quad Set - 2-3 x daily - 7 x weekly - 3 sets - 10 reps  Seated Hamstring Stretch - 2-3 x daily - 7 x weekly - 3 sets - 10 reps      Therapeutic Exercise and NMR EXR  [x] (31647) Provided verbal/tactile cueing for activities related to strengthening, flexibility, endurance, ROM for improvements in LE, proximal hip, and core control with self care, mobility, lifting, ambulation.   [x] (02447) Provided verbal/tactile cueing for activities related to improving balance, coordination, kinesthetic sense, posture, motor skill, proprioception  to assist with LE, proximal hip, and core control in self care, mobility, lifting, ambulation and eccentric single leg control.   [] (51691) Therapist is in constant attendance of 2 or more patients providing skilled therapy interventions, but not including but not limited to self-care, transfers, ambulation, and ascending / descending stairs. Charges:  Timed Code Treatment Minutes: 45   Total Treatment Minutes: 50     [] EVAL - LOW (94459)   [] EVAL - MOD (55461)  [] EVAL - HIGH (38385)  [] RE-EVAL (52561)   [x] JL(49867) x 2      [] Ionto  [x] NMR (42977) x 1       [] Vaso  [] Manual (84546) x       [] Ultrasound  [] TA x 1       [] Mech Traction (14959)  [] Aquatic Therapy x      [] ES (un) (53050):   [] Home Management Training x  [] ES(attended) (75428)   [] Dry Needling 1-2 muscles (38670):  [] Dry Needling 3+ muscles (523031  [] Group:      [] Other:     GOALS:  Patient stated goal: return to jogging  [] Progressing: [] Met: [] Not Met: [] Adjusted    Therapist goals for Patient:   Short Term Goals: To be achieved in: 2 weeks  1. Independent in HEP and progression per patient tolerance, in order to prevent re-injury. [] Progressing: [] Met: [] Not Met: [] Adjusted  2. Patient will have a decrease in pain to facilitate improvement in movement, function, and ADLs as indicated by Functional Deficits. [] Progressing: [] Met: [] Not Met: [] Adjusted    Long Term Goals: To be achieved in: 10-12 weeks  1. FOTO raw score of 70 or greater to assist with reaching prior level of function. [] Progressing: [] Met: [] Not Met: [] Adjusted  2. Patient will demonstrate increased L ankle dorsiflexion AROM to 12 to allow for proper joint functioning as indicated by patients Functional Deficits. [] Progressing: [] Met: [] Not Met: [] Adjusted  3. Patient will demonstrate an increase in global LLE Strength to at least 4+/5 as well as good proximal hip strength and control to allow for proper functional mobility as indicated by patients Functional Deficits. [] Progressing: [] Met: [] Not Met: [] Adjusted  4.  Patient will return to functional activities including ambulating with normalized gait pattern without crutches without increased symptoms or restriction. [] Progressing: [] Met: [] Not Met: [] Adjusted  5. Patient will begin return to jogging progression demonstrating ability to jog 20 minutes continuously without an increase in symptoms. [] Progressing: [] Met: [] Not Met: [] Adjusted     Overall Progression Towards Functional goals/ Treatment Progress Update:  [] Patient is progressing as expected towards functional goals listed. [] Progression is slowed due to complexities/Impairments listed. [] Progression has been slowed due to co-morbidities. [x] Plan just implemented, too soon to assess goals progression <30days   [] Goals require adjustment due to lack of progress  [] Patient is not progressing as expected and requires additional follow up with physician  [] Other    Persisting Functional Limitations/Impairments:  [x]Sitting [x]Standing   [x]Walking [x]Stairs   [x]Transfers [x]ADLs   [x]Squatting/bending [x]Kneeling  [x]Housework [x]Job related tasks  []Driving [x]Sports/Recreation   [x]Sleeping []Other:    ASSESSMENT:  Pt tolerated ex. Additions well without aggravating L gastroc/calf. Pt noted less calf tightness and discomfort following today's session. Pt's active L ankle DF has increased 4deg from initial measurement however, remains 2 deg from neutral and is limited by gastroc discomfort and tightness. Pt is ambulating (I) without AD exhibiting mild limp through L stance phase and decreased DF from MS-HO. Pt demonstrated fair quality of L LL/ankle mobility through prescribed ex's. Pt required cueing to perform ex's with correct technique. Reviewed, discussed current HEP and proper gait mechanics, pt demonstrated good understanding.     Treatment/Activity Tolerance:  [x] Pt able to complete treatment [] Patient limited by fatique  [x] Patient limited by pain  [] Patient limited by other medical complications  [] Other:     Prognosis:  [x] Good [] Fair  [] Poor    Patient Requires Follow-up: [x] Yes  [] No    Return to Play:    [x] N/A   []  Stage 1: Intro to Strength   []  Stage 2: Return to Run and Strength   []  Stage 3: Return to Jump and Strength   []  Stage 4: Dynamic Strength and Agility   []  Stage 5: Sport Specific Training     []  Ready to Return to Play, Meets All Above Stages   []  Not Ready for Return to Sports   Comments:            PLAN: See eval. PT 1-2x / week for 10-12 weeks. [x] Continue per plan of care [] Alter current plan (see comments)  [] Plan of care initiated [] Hold pending MD visit [] Discharge    Electronically signed by: Randell Fajardo, PTA. [id number]        Note: If patient does not return for scheduled/ recommended follow up visits, this note will serve as a discharge from care along with most recent update on progress.

## 2022-04-07 ENCOUNTER — OFFICE VISIT (OUTPATIENT)
Dept: ORTHOPEDIC SURGERY | Age: 42
End: 2022-04-07
Payer: COMMERCIAL

## 2022-04-07 ENCOUNTER — HOSPITAL ENCOUNTER (OUTPATIENT)
Dept: PHYSICAL THERAPY | Age: 42
Setting detail: THERAPIES SERIES
Discharge: HOME | End: 2022-04-07

## 2022-04-07 VITALS — BODY MASS INDEX: 29.35 KG/M2 | HEIGHT: 70 IN | WEIGHT: 205 LBS

## 2022-04-07 DIAGNOSIS — S86.112A STRAIN OF GASTROCNEMIUS MUSCLE OF LEFT LOWER EXTREMITY, INITIAL ENCOUNTER: Primary | ICD-10-CM

## 2022-04-07 PROCEDURE — L4361 PNEUMA/VAC WALK BOOT PRE OTS: HCPCS | Performed by: ORTHOPAEDIC SURGERY

## 2022-04-07 PROCEDURE — G8427 DOCREV CUR MEDS BY ELIG CLIN: HCPCS | Performed by: ORTHOPAEDIC SURGERY

## 2022-04-07 PROCEDURE — G8419 CALC BMI OUT NRM PARAM NOF/U: HCPCS | Performed by: ORTHOPAEDIC SURGERY

## 2022-04-07 PROCEDURE — 99213 OFFICE O/P EST LOW 20 MIN: CPT | Performed by: ORTHOPAEDIC SURGERY

## 2022-04-07 PROCEDURE — 1036F TOBACCO NON-USER: CPT | Performed by: ORTHOPAEDIC SURGERY

## 2022-04-07 RX ORDER — LAMOTRIGINE 25 MG/1
50 TABLET ORAL NIGHTLY
COMMUNITY
Start: 2022-03-22

## 2022-04-07 RX ORDER — LOPERAMIDE HYDROCHLORIDE 2 MG/1
2 CAPSULE ORAL
COMMUNITY

## 2022-04-07 RX ORDER — BUPROPION HYDROCHLORIDE 150 MG/1
150 TABLET ORAL EVERY MORNING
COMMUNITY
Start: 2022-03-22

## 2022-04-07 NOTE — FLOWSHEET NOTE
[address] [facility]    Physical Therapy  Cancellation/No-show Note  Patient Name:  Ana Chambers  :  1980   Date:  2022    Cancelled visits to date: 1  No-shows to date: 0    For today's appointment patient:  [x]  Cancelled  []  Rescheduled appointment  []  No-show     Reason given by patient:  []  Patient ill  []  Conflicting appointment  []  No transportation    []  Conflict with work  []  No reason given  [x]  Other:   Cancelled by PT   Comments:  Pt. Into therapy session this date with significant increase in pain and difficulty walking. Upon inspection pt. Was noted to have increased effusion, tightness and discoloration throughout L calf. Pt. Had MD appointment immediately following therapy session, therefore the decided to hold on therapy until following MD appointment. Pt. Was taken to MD office upstairs and MD staff notified of status change. Will plan to hold on therapy pending MD direction. Phone call information:   []  Phone call made today to patient at _ time at number provided:      []  Patient answered, conversation as follows:    []  Patient did not answer, message left as follows:  []  Phone call not made today  []  Phone call not needed - pt contacted us to cancel and provided reason for cancellation.      Electronically signed by:  Carolyn Mccurdy PT

## 2022-05-12 ENCOUNTER — OFFICE VISIT (OUTPATIENT)
Dept: ORTHOPEDIC SURGERY | Age: 42
End: 2022-05-12
Payer: COMMERCIAL

## 2022-05-12 VITALS — WEIGHT: 200 LBS | BODY MASS INDEX: 28.63 KG/M2 | HEIGHT: 70 IN

## 2022-05-12 DIAGNOSIS — S86.112D STRAIN OF GASTROCNEMIUS MUSCLE OF LEFT LOWER EXTREMITY, SUBSEQUENT ENCOUNTER: Primary | ICD-10-CM

## 2022-05-12 PROCEDURE — G8419 CALC BMI OUT NRM PARAM NOF/U: HCPCS | Performed by: ORTHOPAEDIC SURGERY

## 2022-05-12 PROCEDURE — G8427 DOCREV CUR MEDS BY ELIG CLIN: HCPCS | Performed by: ORTHOPAEDIC SURGERY

## 2022-05-12 PROCEDURE — 99213 OFFICE O/P EST LOW 20 MIN: CPT | Performed by: ORTHOPAEDIC SURGERY

## 2022-05-12 PROCEDURE — 1036F TOBACCO NON-USER: CPT | Performed by: ORTHOPAEDIC SURGERY

## 2022-05-12 NOTE — PROGRESS NOTES
CHIEF COMPLAINT: Left ankle pain. DATE OF INJURY: 3/17/22    History:  Mr. Ayo Sanchez 39 y.o. male presents today for left ankle follow-up. Initial history: to after hours clinic for evaluation of a left calf injury. The injury occurred 2 days ago when he was jumping over a wave in [place]. When he landed he felt immediate calf pain. He is complaining of medial gastrocnemius pain. There is no pain at the achilles. He has not yet been evaluated for the pain. He [place] the airport as he can hardly bear weight on the left leg. He rates pain 9/10. Pain increases with walking and decreases with rest and elevation. No numbness or tingling sensation, and no other complaints. He has been using tylenol for pain control as he cannot take NSAID's due to history of ulcerative colitis. He presents today non weight bearing on crutches. Interval History: He states his calf is feeling much better. He only wore the boot for 2 weeks. Most the time his leg feels okay. He feels like his calf still has a deformity, more so that is still swollen. Past Medical History:   Diagnosis Date    Asthma     UC (ulcerative colitis confined to rectum) ([facility] [address]        History reviewed. No pertinent surgical history.     Current Outpatient Medications on File Prior to Visit   Medication Sig Dispense Refill    buPROPion (WELLBUTRIN XL) 150 MG extended release tablet Take 150 mg by mouth every morning      lamoTRIgine (LAMICTAL) 25 MG tablet Take 50 mg by mouth nightly      loperamide (IMODIUM) 2 MG capsule Take 2 mg by mouth      Tofacitinib Citrate 10 MG TABS Take 10 mg by mouth      inFLIXimab (REMICADE) 100 MG injection Infuse 5 mg/kg intravenously See Admin Instructions      cetirizine (ZYRTEC) 10 MG tablet Take 10 mg by mouth daily      predniSONE (DELTASONE) 20 MG tablet Take 2 tablets by mouth daily 14 tablet 0    albuterol sulfate  (90 BASE) MCG/ACT inhaler Inhale 2 puffs into the lungs every 4 hours as needed for Wheezing or Shortness of Breath Use inhaler with a spacer device 2 Inhaler 5    acetaminophen (TYLENOL) 500 MG tablet Take 2 tablets by mouth every 6 hours as needed for Pain 225 tablet 3     No current facility-administered medications on file prior to visit. Allergies   Allergen Reactions    Naproxen     Penicillins Other (See Comments)    Zithromax [Azithromycin]        Social History     Socioeconomic History    Marital status:      Spouse name: Not on file    Number of children: Not on file    Years of education: Not on file    Highest education level: Not on file   Occupational History    Not on file   Tobacco Use    Smoking status: Former Smoker     Packs/day: 1.00     Years: 15.00     Pack years: 15.00     Types: Cigarettes, E-Cigarettes     Quit date: 2014     Years since quittin.1    Smokeless tobacco: Never Used   Vaping Use    Vaping Use: Every day    Substances: Nicotine, Flavoring   Substance and Sexual Activity    Alcohol use: Yes     Comment: rarely    Drug use: Yes     Comment: 7yrs ago or more    Sexual activity: Yes     Partners: Female     Birth control/protection: Pill   Other Topics Concern    Not on file   Social History Narrative    Not on file     Social Determinants of Health     Financial Resource Strain:     Difficulty of Paying Living Expenses: Not on file   Food Insecurity:     Worried About [address] [employer] in the Last Year: Not on file    [facility] in the Last Year: Not on file   Transportation Needs:     Lack of Transportation (Medical): Not on file    Lack of Transportation (Non-Medical):  Not on file   Physical Activity:     Days of Exercise per Week: Not on file    Minutes of Exercise per Session: Not on file   Stress:     Feeling of Stress : Not on file   Social Connections:     Frequency of Communication with Friends and Family: Not on file    Frequency of Social Gatherings with Friends and Family: Not on file    Attends Christian Services: Not on file    Active Member of Clubs or Organizations: Not on file    Attends Club or Organization Meetings: Not on file    Marital Status: Not on file   Intimate Partner Violence:     Fear of Current or Ex-Partner: Not on file    Emotionally Abused: Not on file    Physically Abused: Not on file    Sexually Abused: Not on file   Housing Stability:     Unable to Pay for Housing in the Last Year: Not on file    Number of [place] in the Last Year: Not on file    Unstable Housing in the Last Year: Not on file       History reviewed. No pertinent family history. Review of Systems:  I have reviewed the clinically relevant past medical history, medications, allergies, family history, social history, and 13 point Review of Systems from the patient's recent history form & documented any details relevant to today's presenting complaints in the history above. The patient's self-reported past medical history, medications, allergies, family history, social history, and Review of Systems form from 3/19/22 have been scanned into the chart under the \"Media\" tab. Physical Examination:  Mr. Amanda Rosenbaum is a 39 y.o. male who presents today in no acute distress, awake, alert, and oriented. Ht 5' 10\" (1.778 m)   Wt 200 lb (90.7 kg)   BMI 28.70 kg/m²       Examination of left lower extremity demonstrates no obvious deformity. Left ankle ROM 30 (dorsiflexion) - 45 (plantarflexion), right ankle 30-50. There is mild to moderate swelling that can be seen in left medial gastrocnemius, no swelling at the ankle. He has good strength in dorsiflexion, eversion, inversion. Plantarflexion strength is decreased. There is no tenderness with palpation at the medial gastrocnemius. I cannot feel a palpable defect, though I do possibly feel some scar tissue.     IMAGING:  Left tibia fibula xrays 3/19/22: There is lucency in the soft tissue of the gastrocnemius muscle around mid-belly        Impression:   Left medial gastrocnemius partial vs complete tear      Plan:   Discussed with patient that I do suspect that he had a medial gastroc tear. Ice/heat as needed. Resume PT. Progressive increase in activity as his symptoms resolved    Follow up 2 months as needed            Leonela Lee. Jose Vasquez MD  Orthopaedic Surgery and Sports Medicine     Disclaimer: This note was generated with use of a verbal recognition program and an attempt was made to check for errors. It is possible that there are still dictated errors within this office note. If so, please bring any significant errors to my attention for an addendum. All efforts were made to ensure that this office note is accurate.

## 2022-05-18 ENCOUNTER — HOSPITAL ENCOUNTER (OUTPATIENT)
Dept: PHYSICAL THERAPY | Age: 42
Setting detail: THERAPIES SERIES
Discharge: HOME | End: 2022-05-18
Payer: COMMERCIAL

## 2022-05-18 PROCEDURE — 97164 PT RE-EVAL EST PLAN CARE: CPT

## 2022-05-18 PROCEDURE — 97110 THERAPEUTIC EXERCISES: CPT

## 2022-05-18 PROCEDURE — 97140 MANUAL THERAPY 1/> REGIONS: CPT

## 2022-05-18 NOTE — PLAN OF CARE
Damian Chung  Phone: [phone]   Fax: [phone]    Physical Therapy Re-Certification Plan of Care    Dear  , Dr. Ivone Hui     We had the pleasure of treating the following patient for physical therapy services at [facility]. A summary of our findings can be found in the updated assessment below. This includes our plan of care. If you have any questions or concerns regarding these findings, please do not hesitate to contact me at the office phone number checked above. Thank you for the referral.     Physician Signature:________________________________Date:__________________  By signing above (or electronic signature), therapist's plan is approved by physician                                                       Sub-sections:   ;   ;       Overall Response to Treatment:   []Patient is responding well to treatment and improvement is noted with regards  to goals   []Patient should continue to improve in reasonable time if they continue HEP   []Patient has plateaued and is no longer responding to skilled PT intervention    []Patient is getting worse and would benefit from return to referring MD   []Patient unable to adhere to initial POC   [x]Other:  Pt returns to PT for L calf strain/ tear. Pt continues to have deficits in soft tissue tension, decrease flexibility/ ROM and decreased strength that is affecting his function, gait and balance. Pt will benefit from skilled PT to improve these deficits for return of PLOF. Recommendation:    [x]PT 1-2x / wk for 6 weeks.                []Hold PT, pending MD visit        Physical Therapy Treatment Note/ Progress Report:     Date:  2022    Patient Name:  Darryl Juan    :  1980  MRN: [id number]  Restrictions/Precautions:    Medical/Treatment Diagnosis Information:         F91.348H (ICD-10-CM) - Strain of other muscle(s) and tendon(s) of posterior muscle group at lower leg level, left leg, initial encounter  Treatment Diagnosis: Impairments of L calf pain with muscle tension, , L ankle ROM, gait, balance. Insurance/Certification information:  PT Insurance Information: [employer] [place]  Physician Information:  Referring Practitioner:  Dr. Adriel Hdez of care signed (Y/N): []  Yes [x]  No     Date of Patient follow up with Physician:      Progress Report: []  Yes  [x]  No     Date Range for reporting period:  Beginning: 3/24/22  Re-eval: 5/18    Progress report due (10 Rx/or 30 days whichever is less): 8/79/21    Recertification due (POC duration/ or 90 days whichever is less):  7/2/22    Visit # Insurance Allowable Auth required? Date Range   2+1 20 []  Yes  [x]  No 2022       Latex Allergy:  [x]NO      []YES  Preferred Language for Healthcare:   [x]English       []other:    Functional Scale:        Date assessed:  FOTO: raw score = 31; risk adjusted = 53   3/24/22  FOTO = raw score = 63; risk adjusted = 65                      5/18/22    Pain level: 0/10;      SUBJECTIVE:  Pt reports no pain now but has lack of mobility, cramping after 8 hour work day. Wants to get back to being active. Pt goal is to be close to 100% by the time he goes to [facility] June 11th. Pt was in boot for 2 weeks which helped. OBJECTIVE: Added ex's emphasizing L LE flexibility, strength, endurance, balance, stability and controlled LE mobility. 5/18   PROM AROM     L R L R   Knee Flexion     WNL wnl   Knee Extension     0 0   Dorsiflexion      -5 15   Plantarflexion     40  50   Inversion      40 40   Eversion      15 17   5/18 - no TTP, tight gastroc with adhesion/nodules noted in muscle belly, pes planus with SLS on L with decreased ankle/foot control.        RESTRICTIONS/PRECAUTIONS: WBAT    Exercises/Interventions:     Therapeutic Exercise (62611)  Resistance / level Sets/sec Reps Notes / Cues   bike       IB        hss       Heel raises        Arch raises Therapeutic Activities (99115)                                   Neuromuscular Re-ed (41805)       airex       BAPS board       bosu                                    Manual Intervention (85559)       Knee mobs/PROM       Tib/Fem Mobs       Patella Mobs       Ankle mobs        hawk  to L gastroc/soleus   x9'                Modalities:     Pt. Education:  -pt educated on diagnosis, prognosis and expectations for rehab  -all pt questions were answered    Home Exercise Program:    Access Code: [email or web address]  URL: [employer]/  Date: 05/18/2022  Prepared by: [employer]    Exercises - completed 5/18 . Blue TB issued   Gastroc Stretch on Wall - 1 x daily - 7 x weekly - 3 sets - 10 reps  Soleus Stretch on Wall - 1 x daily - 7 x weekly - 3 sets - 10 reps  Seated Heel Raise - 1 x daily - 7 x weekly - 3 sets - 10 reps  Seated Heel Raise - 1 x daily - 7 x weekly - 3 sets - 10 reps  Ankle and Toe Plantarflexion with Resistance - 1 x daily - 7 x weekly - 3 sets - 10 reps      Therapeutic Exercise and NMR EXR  [x] (47302) Provided verbal/tactile cueing for activities related to strengthening, flexibility, endurance, ROM for improvements in LE, proximal hip, and core control with self care, mobility, lifting, ambulation. [x] (92289) Provided verbal/tactile cueing for activities related to improving balance, coordination, kinesthetic sense, posture, motor skill, proprioception  to assist with LE, proximal hip, and core control in self care, mobility, lifting, ambulation and eccentric single leg control.   [] (47932) Therapist is in constant attendance of 2 or more patients providing skilled therapy interventions, but not providing any significant amount of measurable one-on-one time to either patient, for improvements in LE, proximal hip, and core control in self care, mobility, lifting, ambulation and eccentric single leg control.      NMR and Therapeutic Activities:    [x] (19034 or 59129) Provided verbal/tactile cueing for activities related to improving balance, coordination, kinesthetic sense, posture, motor skill, proprioception and motor activation to allow for proper function of core, proximal hip and LE with self care and ADLs  [x] (33439) Gait Re-education- Provided training and instruction to the patient for proper LE, core and proximal hip recruitment and positioning and eccentric body weight control with ambulation re-education including up and down stairs     Home Exercise Program:    [] (72752) Reviewed/Progressed HEP activities related to strengthening, flexibility, endurance, ROM of core, proximal hip and LE for functional self-care, mobility, lifting and ambulation/stair navigation   [] (36047)Reviewed/Progressed HEP activities related to improving balance, coordination, kinesthetic sense, posture, motor skill, proprioception of core, proximal hip and LE for self care, mobility, lifting, and ambulation/stair navigation      Manual Treatments:  PROM / STM / Oscillations-Mobs:  G-I, II, III, IV (PA's, Inf., Post.)  [x] (05331) Provided manual therapy to mobilize LE, proximal hip and/or LS spine soft tissue/joints for the purpose of modulating pain, promoting relaxation,  increasing ROM, reducing/eliminating soft tissue swelling/inflammation/restriction, improving soft tissue extensibility and allowing for proper ROM for normal function with self care, mobility, lifting and ambulation. Modalities:  [] (97863) Vasopneumatic compression: Utilized vasopneumatic compression to decrease edema / swelling for the purpose of improving mobility and quad tone / recruitment which will allow for increased overall function including but not limited to self-care, transfers, ambulation, and ascending / descending stairs.        Charges:  Timed Code Treatment Minutes: 27   Total Treatment Minutes: 45     [] EVAL - LOW (57270)   [] EVAL - MOD (93359)  [] EVAL - HIGH (86511)  [x] RE-EVAL (96462)   [x] XF(19131) x 1    [] Ionto  [] NMR (78145) x 1       [] Vaso  [x] Manual (42059) x  1     [] Ultrasound  [] TA x 1       [] Mech Traction (48443)  [] Aquatic Therapy x      [] ES (un) (76367):   [] Home Management Training x  [] ES(attended) (58840)   [] Dry Needling 1-2 muscles (32373):  [] Dry Needling 3+ muscles (276605  [] Group:      [] Other:     GOALS:  Patient stated goal: return to jogging, working out   [] Progressing: [] Met: [] Not Met: [] Adjusted    Therapist goals for Patient:   Short Term Goals: To be achieved in: 2 weeks  1. Independent in HEP and progression per patient tolerance, in order to prevent re-injury. [] Progressing: [] Met: [] Not Met: [] Adjusted  2. Patient will have a decrease in pain to facilitate improvement in movement, function, and ADLs as indicated by Functional Deficits. [] Progressing: [] Met: [] Not Met: [] Adjusted    Long Term Goals: To be achieved in: 6 weeks  1. FOTO raw score of 75 or greater to assist with reaching prior level of function. [] Progressing: [] Met: [] Not Met: [] Adjusted  2. Patient will demonstrate increased L ankle dorsiflexion AROM to 10 to allow for proper joint functioning as indicated by patients Functional Deficits. [] Progressing: [] Met: [] Not Met: [] Adjusted  3. Patient will demonstrate an increase in global LLE Strength to at least 4+/5 as well as good proximal hip strength and control to allow for proper functional mobility as indicated by patients Functional Deficits. [] Progressing: [] Met: [] Not Met: [] Adjusted  4. Patient will return to functional activities including ambulating with normalized gait pattern without increased symptoms or restriction. [] Progressing: [] Met: [] Not Met: [] Adjusted  5. Patient will begin return to jogging progression demonstrating ability to jog 20 minutes continuously without an increase in symptoms.     [] Progressing: [] Met: [] Not Met: [] Adjusted     Overall Progression Towards Functional goals/ Treatment Progress Update:  [] Patient is progressing as expected towards functional goals listed. [] Progression is slowed due to complexities/Impairments listed. [] Progression has been slowed due to co-morbidities. [x] Plan just implemented, too soon to assess goals progression <30days   [] Goals require adjustment due to lack of progress  [] Patient is not progressing as expected and requires additional follow up with physician  [] Other    Persisting Functional Limitations/Impairments:  [x]Sitting [x]Standing   [x]Walking [x]Stairs   [x]Transfers [x]ADLs   [x]Squatting/bending [x]Kneeling  [x]Housework [x]Job related tasks  []Driving [x]Sports/Recreation   [x]Sleeping []Other:    ASSESSMENT:    Treatment/Activity Tolerance:  [x] Pt able to complete treatment [] Patient limited by fatique  [] Patient limited by pain  [] Patient limited by other medical complications  [] Other:     Prognosis:  [x] Good [] Fair  [] Poor    Patient Requires Follow-up: [x] Yes  [] No        PLAN: See eval. PT 1-2x / week for 6 weeks. [] Continue per plan of care [] Alter current plan (see comments)  [x] Plan of care initiated [] Hold pending MD visit [] Discharge    Electronically signed by: Sammy Cardona PT        Note: If patient does not return for scheduled/ recommended follow up visits, this note will serve as a discharge from care along with most recent update on progress.

## 2022-05-25 ENCOUNTER — HOSPITAL ENCOUNTER (OUTPATIENT)
Dept: PHYSICAL THERAPY | Age: 42
Setting detail: THERAPIES SERIES
Discharge: HOME | End: 2022-05-25
Payer: COMMERCIAL

## 2022-05-25 PROCEDURE — 97110 THERAPEUTIC EXERCISES: CPT

## 2022-05-25 PROCEDURE — 97140 MANUAL THERAPY 1/> REGIONS: CPT

## 2022-05-25 PROCEDURE — 97112 NEUROMUSCULAR REEDUCATION: CPT

## 2022-05-25 NOTE — FLOWSHEET NOTE
[facility]  Phone: [phone]   Fax: [phone]      Physical Therapy Treatment Note/ Progress Report:     Date:  2022    Patient Name:  Abdullahi Roe    :  1980  MRN: [id number]  Restrictions/Precautions:    Medical/Treatment Diagnosis Information:         M21.468R (ICD-10-CM) - Strain of other muscle(s) and tendon(s) of posterior muscle group at lower leg level, left leg, initial encounter  Treatment Diagnosis: Impairments of L calf pain with muscle tension, , L ankle ROM, gait, balance. Insurance/Certification information:  PT Insurance Information: [employer] [place]  Physician Information:  Referring Practitioner:  Dr. Negrita Bernal of care signed (Y/N): []  Yes [x]  No     Date of Patient follow up with Physician:      Progress Report: []  Yes  [x]  No     Date Range for reporting period:  Beginning: 3/24/22  Re-eval:     Progress report due (10 Rx/or 30 days whichever is less): 3/89/99    Recertification due (POC duration/ or 90 days whichever is less):  22    Visit # Insurance Allowable Auth required? Date Range   2+2 20 []  Yes  [x]  No        Latex Allergy:  [x]NO      []YES  Preferred Language for Healthcare:   [x]English       []other:    Functional Scale:        Date assessed:  FOTO: raw score = 31; risk adjusted = 53   3/24/22  FOTO = raw score = 63; risk adjusted = 65                      22    Pain level: 1/10;      SUBJECTIVE:  Pain and stiffness is mild at the moment, has been busy so hasn't done much of the exercises. OBJECTIVE:        PROM AROM     L R L R   Knee Flexion     WNL wnl   Knee Extension     0 0   Dorsiflexion      -5 15   Plantarflexion     40  50   Inversion      40 40   Eversion      15 17    - no TTP, tight gastroc with adhesion/nodules noted in muscle belly, pes planus with SLS on L with decreased ankle/foot control.        RESTRICTIONS/PRECAUTIONS: WBAT    Exercises/Interventions:     Therapeutic Exercise (19087)  Resistance / level Sets/sec Reps Notes / Cues   bike 2.0 5'     IB gastroc  Soleus   2  2 30''  30''    hss              Arch raises   5'' 15    Towel scrunches   2'     Wall sits   15'' 4    Leg press       Soleus press  15# KB 2 10           SL heel raise off step                             Therapeutic Activities (78939)                                   Neuromuscular Re-ed (20174)       airex       BAPS board       bosu lunge  bosu rocks  5''  x30 20                                Manual Intervention (52338)       Knee mobs/PROM       Tib/Fem Mobs       Patella Mobs       Ankle mobs        hawk  to L gastroc/soleus   x10'                Modalities:     Pt. Education:  -pt educated on diagnosis, prognosis and expectations for rehab  -all pt questions were answered    Home Exercise Program:    Access Code: [email or web address]  URL: [employer]/  Date: 05/18/2022  Prepared by: [employer]    Exercises - completed 5/18 . Blue TB issued   Gastroc Stretch on Wall - 1 x daily - 7 x weekly - 3 sets - 10 reps  Soleus Stretch on Wall - 1 x daily - 7 x weekly - 3 sets - 10 reps  Seated Heel Raise - 1 x daily - 7 x weekly - 3 sets - 10 reps  Seated Heel Raise - 1 x daily - 7 x weekly - 3 sets - 10 reps  Ankle and Toe Plantarflexion with Resistance - 1 x daily - 7 x weekly - 3 sets - 10 reps    Access Code: [email or web address]  URL: [employer]/  Date: 05/25/2022  Prepared by: Nonnie Gentle    Exercises  Towel Scrunches - 1 x daily - 7 x weekly - 3 sets - 10 reps  Arch Lifting - 1 x daily - 7 x weekly - 3 sets - 10 reps  Standing Heel Raise - 1 x daily - 7 x weekly - 3 sets - 10 reps  Squat on Flat Side of BOSU® - 1 x daily - 7 x weekly - 3 sets - 10 reps  Lunge Onto BOSU® Ball - 1 x daily - 7 x weekly - 3 sets - 10 reps        Therapeutic Exercise and NMR EXR  [x] (57343) Provided verbal/tactile cueing for activities related to strengthening, flexibility, endurance, ROM for improvements in LE, proximal hip, and core control with self care, mobility, lifting, ambulation. [x] (76956) Provided verbal/tactile cueing for activities related to improving balance, coordination, kinesthetic sense, posture, motor skill, proprioception  to assist with LE, proximal hip, and core control in self care, mobility, lifting, ambulation and eccentric single leg control.   [] (16222) Therapist is in constant attendance of 2 or more patients providing skilled therapy interventions, but not providing any significant amount of measurable one-on-one time to either patient, for improvements in LE, proximal hip, and core control in self care, mobility, lifting, ambulation and eccentric single leg control.      NMR and Therapeutic Activities:    [x] (35536 or 41471) Provided verbal/tactile cueing for activities related to improving balance, coordination, kinesthetic sense, posture, motor skill, proprioception and motor activation to allow for proper function of core, proximal hip and LE with self care and ADLs  [x] (45055) Gait Re-education- Provided training and instruction to the patient for proper LE, core and proximal hip recruitment and positioning and eccentric body weight control with ambulation re-education including up and down stairs     Home Exercise Program:    [] (38772) Reviewed/Progressed HEP activities related to strengthening, flexibility, endurance, ROM of core, proximal hip and LE for functional self-care, mobility, lifting and ambulation/stair navigation   [] (84925)Reviewed/Progressed HEP activities related to improving balance, coordination, kinesthetic sense, posture, motor skill, proprioception of core, proximal hip and LE for self care, mobility, lifting, and ambulation/stair navigation      Manual Treatments:  PROM / STM / Oscillations-Mobs:  G-I, II, III, IV (PA's, Inf., Post.)  [x] (44655) Provided manual therapy to mobilize LE, proximal hip and/or LS spine soft tissue/joints for the purpose of modulating pain, promoting relaxation,  increasing ROM, reducing/eliminating soft tissue swelling/inflammation/restriction, improving soft tissue extensibility and allowing for proper ROM for normal function with self care, mobility, lifting and ambulation. Modalities:  [] (25294) Vasopneumatic compression: Utilized vasopneumatic compression to decrease edema / swelling for the purpose of improving mobility and quad tone / recruitment which will allow for increased overall function including but not limited to self-care, transfers, ambulation, and ascending / descending stairs. Charges:  Timed Code Treatment Minutes: 45   Total Treatment Minutes: 45     [] EVAL - LOW (15807)   [] EVAL - MOD (00252)  [] EVAL - HIGH (04089)  [] RE-EVAL (17934)   [x] QB(27773) x 1    [] Ionto  [x] NMR (45391) x 1       [] Vaso  [x] Manual (55739) x  1     [] Ultrasound  [] TA x 1       [] Mech Traction (87239)  [] Aquatic Therapy x      [] ES (un) (99135):   [] Home Management Training x  [] ES(attended) (71196)   [] Dry Needling 1-2 muscles (30710):  [] Dry Needling 3+ muscles (204606  [] Group:      [] Other:     GOALS:  Patient stated goal: return to jogging, working out   [] Progressing: [] Met: [] Not Met: [] Adjusted    Therapist goals for Patient:   Short Term Goals: To be achieved in: 2 weeks  1. Independent in HEP and progression per patient tolerance, in order to prevent re-injury. [] Progressing: [] Met: [] Not Met: [] Adjusted  2. Patient will have a decrease in pain to facilitate improvement in movement, function, and ADLs as indicated by Functional Deficits. [] Progressing: [] Met: [] Not Met: [] Adjusted    Long Term Goals: To be achieved in: 6 weeks  1. FOTO raw score of 75 or greater to assist with reaching prior level of function. [] Progressing: [] Met: [] Not Met: [] Adjusted  2.  Patient will demonstrate increased L ankle dorsiflexion AROM to 10 to allow for proper joint functioning as indicated by patients Functional Deficits. [] Progressing: [] Met: [] Not Met: [] Adjusted  3. Patient will demonstrate an increase in global LLE Strength to at least 4+/5 as well as good proximal hip strength and control to allow for proper functional mobility as indicated by patients Functional Deficits. [] Progressing: [] Met: [] Not Met: [] Adjusted  4. Patient will return to functional activities including ambulating with normalized gait pattern without increased symptoms or restriction. [] Progressing: [] Met: [] Not Met: [] Adjusted  5. Patient will begin return to jogging progression demonstrating ability to jog 20 minutes continuously without an increase in symptoms. [] Progressing: [] Met: [] Not Met: [] Adjusted     Overall Progression Towards Functional goals/ Treatment Progress Update:  [] Patient is progressing as expected towards functional goals listed. [] Progression is slowed due to complexities/Impairments listed. [] Progression has been slowed due to co-morbidities. [x] Plan just implemented, too soon to assess goals progression <30days   [] Goals require adjustment due to lack of progress  [] Patient is not progressing as expected and requires additional follow up with physician  [] Other    Persisting Functional Limitations/Impairments:  [x]Sitting [x]Standing   [x]Walking [x]Stairs   [x]Transfers [x]ADLs   [x]Squatting/bending [x]Kneeling  [x]Housework [x]Job related tasks  []Driving [x]Sports/Recreation   [x]Sleeping []Other:    ASSESSMENT:  Pt with good tolerance to todays visit, no pain or issues during visit. Added to HEP today for pt to work on deficits . Continue to progress as tolerated for LTG achievement.      Treatment/Activity Tolerance:  [x] Pt able to complete treatment [] Patient limited by fatique  [] Patient limited by pain  [] Patient limited by other medical complications  [] Other:     Prognosis:  [x] Good [] Fair  [] Poor    Patient Requires Follow-up: [x] Yes  [] No        PLAN: See eval. PT 1-2x / week for 6 weeks. [x] Continue per plan of care [] Alter current plan (see comments)  [] Plan of care initiated [] Hold pending MD visit [] Discharge    Electronically signed by: Stef Sifuentes PT        Note: If patient does not return for scheduled/ recommended follow up visits, this note will serve as a discharge from care along with most recent update on progress.

## 2022-06-02 ENCOUNTER — HOSPITAL ENCOUNTER (OUTPATIENT)
Dept: PHYSICAL THERAPY | Age: 42
Setting detail: THERAPIES SERIES
Discharge: HOME | End: 2022-06-02
Payer: COMMERCIAL

## 2022-06-02 PROCEDURE — 97110 THERAPEUTIC EXERCISES: CPT

## 2022-06-02 PROCEDURE — 97112 NEUROMUSCULAR REEDUCATION: CPT

## 2022-06-02 PROCEDURE — 97140 MANUAL THERAPY 1/> REGIONS: CPT

## 2022-06-02 NOTE — FLOWSHEET NOTE
Smith Gonzalez  Phone: [phone]   Fax: [phone]      Physical Therapy Treatment Note/ Progress Report:     Date:  2022    Patient Name:  Tamera De    :  1980  MRN: [id number]  Restrictions/Precautions:    Medical/Treatment Diagnosis Information:         C14.825A (ICD-10-CM) - Strain of other muscle(s) and tendon(s) of posterior muscle group at lower leg level, left leg, initial encounter  Treatment Diagnosis: Impairments of L calf pain with muscle tension, , L ankle ROM, gait, balance. Insurance/Certification information:  PT Insurance Information: [employer] [place]  Physician Information:  Referring Practitioner:  Dr. Ruth Lyn of care signed (Y/N): []  Yes [x]  No     Date of Patient follow up with Physician:      Progress Report: []  Yes  [x]  No     Date Range for reporting period:  Beginning: 3/24/22  Re-eval:     Progress report due (10 Rx/or 30 days whichever is less): 35    Recertification due (POC duration/ or 90 days whichever is less):  22    Visit # Insurance Allowable Auth required? Date Range   2+3 20 []  Yes  [x]  No        Latex Allergy:  [x]NO      []YES  Preferred Language for Healthcare:   [x]English       []other:    Functional Scale:        Date assessed:  FOTO: raw score = 31; risk adjusted = 53   3/24/22  FOTO = raw score = 63; risk adjusted = 65                      22    Pain level: 1/10;      SUBJECTIVE:  Pt reports being relatively asymptomatic yesterday however, this morning notes mild L gastroc tightness and soreness. OBJECTIVE:        PROM AROM     L R L R   Knee Flexion     WNL wnl   Knee Extension     0 0   Dorsiflexion      -5 15   Plantarflexion     40  50   Inversion      40 40   Eversion      15 17    - no TTP, tight gastroc with adhesion/nodules noted in muscle belly, pes planus with SLS on L with decreased ankle/foot control.        RESTRICTIONS/PRECAUTIONS: WBAT    Exercises/Interventions:     Therapeutic Exercise (85413)  Resistance / level Sets/sec Reps Notes / Cues   bike 2.0 5'     IB gastroc  Soleus   2  2 30''  30''    hss              Arch raises   5'' 15    Towel scrunches   2'     Wall sits   15'' 4    Leg press       Soleus press  15# KB 2 10           SL heel raise off step                             Therapeutic Activities (39268)                                   Neuromuscular Re-ed (72502)       airex       BAPS board       bosu lunge  bosu rocks  5''  x30 20                                Manual Intervention (11718)       Knee mobs/PROM       Tib/Fem Mobs       Patella Mobs       Ankle mobs        hawk  to L gastroc/soleus   x10'                Modalities:     Pt. Education:  -pt educated on diagnosis, prognosis and expectations for rehab  -all pt questions were answered    Home Exercise Program:    Access Code: [email or web address]  URL: [email or web address]/  Date: 05/18/2022  Prepared by: Loral Standing    Exercises - completed 5/18 . Blue TB issued   Gastroc Stretch on Wall - 1 x daily - 7 x weekly - 3 sets - 10 reps  Soleus Stretch on Wall - 1 x daily - 7 x weekly - 3 sets - 10 reps  Seated Heel Raise - 1 x daily - 7 x weekly - 3 sets - 10 reps  Seated Heel Raise - 1 x daily - 7 x weekly - 3 sets - 10 reps  Ankle and Toe Plantarflexion with Resistance - 1 x daily - 7 x weekly - 3 sets - 10 reps    Access Code: [email or web address]  URL: [employer]/  Date: 05/25/2022  Prepared by: Loral Standing    Exercises  Towel Scrunches - 1 x daily - 7 x weekly - 3 sets - 10 reps  Arch Lifting - 1 x daily - 7 x weekly - 3 sets - 10 reps  Standing Heel Raise - 1 x daily - 7 x weekly - 3 sets - 10 reps  Squat on Flat Side of BOSU® - 1 x daily - 7 x weekly - 3 sets - 10 reps  Lunge Onto BOSU® Ball - 1 x daily - 7 x weekly - 3 sets - 10 reps        Therapeutic Exercise and NMR EXR  [x] (44262) Provided verbal/tactile cueing for activities related to strengthening, flexibility, endurance, ROM for improvements in LE, proximal hip, and core control with self care, mobility, lifting, ambulation. [x] (26371) Provided verbal/tactile cueing for activities related to improving balance, coordination, kinesthetic sense, posture, motor skill, proprioception  to assist with LE, proximal hip, and core control in self care, mobility, lifting, ambulation and eccentric single leg control.   [] (47429) Therapist is in constant attendance of 2 or more patients providing skilled therapy interventions, but not providing any significant amount of measurable one-on-one time to either patient, for improvements in LE, proximal hip, and core control in self care, mobility, lifting, ambulation and eccentric single leg control.      NMR and Therapeutic Activities:    [x] (71449 or 84773) Provided verbal/tactile cueing for activities related to improving balance, coordination, kinesthetic sense, posture, motor skill, proprioception and motor activation to allow for proper function of core, proximal hip and LE with self care and ADLs  [x] (81875) Gait Re-education- Provided training and instruction to the patient for proper LE, core and proximal hip recruitment and positioning and eccentric body weight control with ambulation re-education including up and down stairs     Home Exercise Program:    [] (80416) Reviewed/Progressed HEP activities related to strengthening, flexibility, endurance, ROM of core, proximal hip and LE for functional self-care, mobility, lifting and ambulation/stair navigation   [] (66685)Reviewed/Progressed HEP activities related to improving balance, coordination, kinesthetic sense, posture, motor skill, proprioception of core, proximal hip and LE for self care, mobility, lifting, and ambulation/stair navigation      Manual Treatments:  PROM / STM / Oscillations-Mobs:  G-I, II, III, IV (PA's, Inf., Post.)  [x] (30287) Provided manual therapy to mobilize LE, proximal hip and/or LS spine soft tissue/joints for the purpose of modulating pain, promoting relaxation,  increasing ROM, reducing/eliminating soft tissue swelling/inflammation/restriction, improving soft tissue extensibility and allowing for proper ROM for normal function with self care, mobility, lifting and ambulation. Modalities:  [] (70422) Vasopneumatic compression: Utilized vasopneumatic compression to decrease edema / swelling for the purpose of improving mobility and quad tone / recruitment which will allow for increased overall function including but not limited to self-care, transfers, ambulation, and ascending / descending stairs. Charges:  Timed Code Treatment Minutes: 45   Total Treatment Minutes: 45     [] EVAL - LOW (89587)   [] EVAL - MOD (47070)  [] EVAL - HIGH (80534)  [] RE-EVAL (22652)   [x] WX(96461) x 1    [] Ionto  [x] NMR (08048) x 1       [] Vaso  [x] Manual (71353) x  1     [] Ultrasound  [] TA x 1       [] Mech Traction (67898)  [] Aquatic Therapy x      [] ES (un) (07334):   [] Home Management Training x  [] ES(attended) (52468)   [] Dry Needling 1-2 muscles (88172):  [] Dry Needling 3+ muscles (441725  [] Group:      [] Other:     GOALS:  Patient stated goal: return to jogging, working out   [] Progressing: [] Met: [] Not Met: [] Adjusted    Therapist goals for Patient:   Short Term Goals: To be achieved in: 2 weeks  1. Independent in HEP and progression per patient tolerance, in order to prevent re-injury. [] Progressing: [] Met: [] Not Met: [] Adjusted  2. Patient will have a decrease in pain to facilitate improvement in movement, function, and ADLs as indicated by Functional Deficits. [] Progressing: [] Met: [] Not Met: [] Adjusted    Long Term Goals: To be achieved in: 6 weeks  1. FOTO raw score of 75 or greater to assist with reaching prior level of function. [] Progressing: [] Met: [] Not Met: [] Adjusted  2.  Patient will demonstrate increased L ankle dorsiflexion AROM to 10 to allow for proper joint functioning as indicated by patients Functional Deficits. [] Progressing: [] Met: [] Not Met: [] Adjusted  3. Patient will demonstrate an increase in global LLE Strength to at least 4+/5 as well as good proximal hip strength and control to allow for proper functional mobility as indicated by patients Functional Deficits. [] Progressing: [] Met: [] Not Met: [] Adjusted  4. Patient will return to functional activities including ambulating with normalized gait pattern without increased symptoms or restriction. [] Progressing: [] Met: [] Not Met: [] Adjusted  5. Patient will begin return to jogging progression demonstrating ability to jog 20 minutes continuously without an increase in symptoms. [] Progressing: [] Met: [] Not Met: [] Adjusted     Overall Progression Towards Functional goals/ Treatment Progress Update:  [] Patient is progressing as expected towards functional goals listed. [] Progression is slowed due to complexities/Impairments listed. [] Progression has been slowed due to co-morbidities. [x] Plan just implemented, too soon to assess goals progression <30days   [] Goals require adjustment due to lack of progress  [] Patient is not progressing as expected and requires additional follow up with physician  [] Other    Persisting Functional Limitations/Impairments:  [x]Sitting [x]Standing   [x]Walking [x]Stairs   [x]Transfers [x]ADLs   [x]Squatting/bending [x]Kneeling  [x]Housework [x]Job related tasks  []Driving [x]Sports/Recreation   [x]Sleeping []Other:    ASSESSMENT: Pt was very TTP along medial L gastroc head with palpable tone. Pt noted immediate drop in gastroc head tightness following manual techniques and was able to tolerate slant stretch with less restriction. Pt was able to complete today's treatment well without aggravating calf and noted substantial relief following completion of treatment. Pt is making good progress towards remaining goals.      Treatment/Activity Tolerance:  [x] Pt able to complete treatment [] Patient limited by fatique  [] Patient limited by pain  [] Patient limited by other medical complications  [] Other:     Prognosis:  [x] Good [] Fair  [] Poor    Patient Requires Follow-up: [x] Yes  [] No        PLAN: See eval. PT 1-2x / week for 6 weeks. [x] Continue per plan of care [] Alter current plan (see comments)  [] Plan of care initiated [] Hold pending MD visit [] Discharge    Electronically signed by: Frankie Chavez PTA, ATC        Note: If patient does not return for scheduled/ recommended follow up visits, this note will serve as a discharge from care along with most recent update on progress.

## 2022-06-06 ENCOUNTER — HOSPITAL ENCOUNTER (OUTPATIENT)
Dept: PHYSICAL THERAPY | Age: 42
Setting detail: THERAPIES SERIES
Discharge: HOME | End: 2022-06-06
Payer: COMMERCIAL

## 2022-06-06 PROCEDURE — 97112 NEUROMUSCULAR REEDUCATION: CPT

## 2022-06-06 PROCEDURE — 97140 MANUAL THERAPY 1/> REGIONS: CPT

## 2022-06-06 PROCEDURE — 97110 THERAPEUTIC EXERCISES: CPT

## 2022-06-06 NOTE — FLOWSHEET NOTE
Smith Chung  Phone: [phone]   Fax: [phone]      Physical Therapy Treatment Note/ Progress Report:     Date:  2022    Patient Name:  Darryl Juan    :  1980  MRN: [id number]  Restrictions/Precautions:    Medical/Treatment Diagnosis Information:         I50.816D (ICD-10-CM) - Strain of other muscle(s) and tendon(s) of posterior muscle group at lower leg level, left leg, initial encounter  Treatment Diagnosis: Impairments of L calf pain with muscle tension, , L ankle ROM, gait, balance. Insurance/Certification information:  PT Insurance Information: [employer] [place]  Physician Information:  Referring Practitioner:  Dr. Kiley Lane of care signed (Y/N): []  Yes [x]  No     Date of Patient follow up with Physician:      Progress Report: []  Yes  [x]  No     Date Range for reporting period:  Beginning: 3/24/22  Re-eval:     Progress report due (10 Rx/or 30 days whichever is less):     Recertification due (POC duration/ or 90 days whichever is less):  22    Visit # Insurance Allowable Auth required? Date Range   2+4 20 []  Yes  [x]  No        Latex Allergy:  [x]NO      []YES  Preferred Language for Healthcare:   [x]English       []other:    Functional Scale:        Date assessed:  FOTO: raw score = 31; risk adjusted = 53   3/24/22  FOTO = raw score = 63; risk adjusted = 65                      22    Pain level: 1/10       SUBJECTIVE: Pt reports things are continuing to slowly get better, noticing less tightness in calf. OBJECTIVE:      -    PROM AROM     L R L R   Knee Flexion     WNL wnl   Knee Extension     0 0   Dorsiflexion      -5 15   Plantarflexion     40  50   Inversion      40 40   Eversion      15 17    - no TTP, tight gastroc with adhesion/nodules noted in muscle belly, pes planus with SLS on L with decreased ankle/foot control.        RESTRICTIONS/PRECAUTIONS: WBAT    Exercises/Interventions: Therapeutic Exercise (12404)  Resistance / level Sets/sec Reps Notes / Cues   bike 2.0 5'     IB gastroc  Soleus   2  2 30''  30''    Standing  B HR   2 10           Arch raises      Towel scrunches      Wall sits      Leg press       Soleus press on quantum machine  40# 2 10           SL heel raise off step   1 10           Glider rev lunge  2 10                                Therapeutic Activities (68756)                                   Neuromuscular Re-ed (75910)       airex       BAPS board       bosu lunge  bosu rocks  bosu squats   5''  x30 20    TG 2 up 1 down ecc   2 10    Lateral band walks ( laces )   20'  4                  Manual Intervention (42925)       Knee mobs/PROM       Tib/Fem Mobs       Patella Mobs       Ankle mobs        hawk  to L gastroc/soleus   x10'                Modalities:     Pt. Education:  -pt educated on diagnosis, prognosis and expectations for rehab  -all pt questions were answered    Home Exercise Program:    Access Code: [email or web address]  URL: [email or web address]/  Date: 05/18/2022  Prepared by: Tom Corona    Exercises - completed 5/18 . Blue TB issued   Gastroc Stretch on Wall - 1 x daily - 7 x weekly - 3 sets - 10 reps  Soleus Stretch on Wall - 1 x daily - 7 x weekly - 3 sets - 10 reps  Seated Heel Raise - 1 x daily - 7 x weekly - 3 sets - 10 reps  Seated Heel Raise - 1 x daily - 7 x weekly - 3 sets - 10 reps  Ankle and Toe Plantarflexion with Resistance - 1 x daily - 7 x weekly - 3 sets - 10 reps    Access Code: [email or web address]  URL: [email or web address]/  Date: 05/25/2022  Prepared by: Tom Corona    Exercises  Towel Scrunches - 1 x daily - 7 x weekly - 3 sets - 10 reps  Arch Lifting - 1 x daily - 7 x weekly - 3 sets - 10 reps  Standing Heel Raise - 1 x daily - 7 x weekly - 3 sets - 10 reps  Squat on Flat Side of BOSU® - 1 x daily - 7 x weekly - 3 sets - 10 reps  Lunge Onto BOSU® Ball - 1 x daily - 7 x weekly - 3 sets - 10 reps        Therapeutic Exercise and NMR EXR  [x] (55276) Provided verbal/tactile cueing for activities related to strengthening, flexibility, endurance, ROM for improvements in LE, proximal hip, and core control with self care, mobility, lifting, ambulation. [x] (51646) Provided verbal/tactile cueing for activities related to improving balance, coordination, kinesthetic sense, posture, motor skill, proprioception  to assist with LE, proximal hip, and core control in self care, mobility, lifting, ambulation and eccentric single leg control.   [] (00592) Therapist is in constant attendance of 2 or more patients providing skilled therapy interventions, but not providing any significant amount of measurable one-on-one time to either patient, for improvements in LE, proximal hip, and core control in self care, mobility, lifting, ambulation and eccentric single leg control.      NMR and Therapeutic Activities:    [x] (51984 or 33507) Provided verbal/tactile cueing for activities related to improving balance, coordination, kinesthetic sense, posture, motor skill, proprioception and motor activation to allow for proper function of core, proximal hip and LE with self care and ADLs  [x] (81523) Gait Re-education- Provided training and instruction to the patient for proper LE, core and proximal hip recruitment and positioning and eccentric body weight control with ambulation re-education including up and down stairs     Home Exercise Program:    [] (24679) Reviewed/Progressed HEP activities related to strengthening, flexibility, endurance, ROM of core, proximal hip and LE for functional self-care, mobility, lifting and ambulation/stair navigation   [] (45993)Reviewed/Progressed HEP activities related to improving balance, coordination, kinesthetic sense, posture, motor skill, proprioception of core, proximal hip and LE for self care, mobility, lifting, and ambulation/stair navigation      Manual Treatments:  PROM / STM / Oscillations-Mobs:  G-I, II, III, IV (PA's, Inf., Post.)  [x] (20092) Provided manual therapy to mobilize LE, proximal hip and/or LS spine soft tissue/joints for the purpose of modulating pain, promoting relaxation,  increasing ROM, reducing/eliminating soft tissue swelling/inflammation/restriction, improving soft tissue extensibility and allowing for proper ROM for normal function with self care, mobility, lifting and ambulation. Modalities:  [] (83096) Vasopneumatic compression: Utilized vasopneumatic compression to decrease edema / swelling for the purpose of improving mobility and quad tone / recruitment which will allow for increased overall function including but not limited to self-care, transfers, ambulation, and ascending / descending stairs. Charges:  Timed Code Treatment Minutes: 45   Total Treatment Minutes: 45     [] EVAL - LOW (64545)   [] EVAL - MOD (57195)  [] EVAL - HIGH (39180)  [] RE-EVAL (27239)   [x] RADHA(41680) x 1    [] Ionto  [x] NMR (41552) x 1       [] Vaso  [x] Manual (87828) x  1     [] Ultrasound  [] TA x 1       [] Mech Traction (18848)  [] Aquatic Therapy x      [] ES (un) (51610):   [] Home Management Training x  [] ES(attended) (34328)   [] Dry Needling 1-2 muscles (75415):  [] Dry Needling 3+ muscles (698167  [] Group:      [] Other:     GOALS:  Patient stated goal: return to jogging, working out   [] Progressing: [] Met: [] Not Met: [] Adjusted    Therapist goals for Patient:   Short Term Goals: To be achieved in: 2 weeks  1. Independent in HEP and progression per patient tolerance, in order to prevent re-injury. [] Progressing: [] Met: [] Not Met: [] Adjusted  2. Patient will have a decrease in pain to facilitate improvement in movement, function, and ADLs as indicated by Functional Deficits. [] Progressing: [] Met: [] Not Met: [] Adjusted    Long Term Goals: To be achieved in: 6 weeks  1. FOTO raw score of 75 or greater to assist with reaching prior level of function.    [] Progressing: [] Met: [] Not Met: [] Adjusted  2. Patient will demonstrate increased L ankle dorsiflexion AROM to 10 to allow for proper joint functioning as indicated by patients Functional Deficits. [] Progressing: [] Met: [] Not Met: [] Adjusted  3. Patient will demonstrate an increase in global LLE Strength to at least 4+/5 as well as good proximal hip strength and control to allow for proper functional mobility as indicated by patients Functional Deficits. [] Progressing: [] Met: [] Not Met: [] Adjusted  4. Patient will return to functional activities including ambulating with normalized gait pattern without increased symptoms or restriction. [] Progressing: [] Met: [] Not Met: [] Adjusted  5. Patient will begin return to jogging progression demonstrating ability to jog 20 minutes continuously without an increase in symptoms. [] Progressing: [] Met: [] Not Met: [] Adjusted     Overall Progression Towards Functional goals/ Treatment Progress Update:  [] Patient is progressing as expected towards functional goals listed. [] Progression is slowed due to complexities/Impairments listed. [] Progression has been slowed due to co-morbidities. [x] Plan just implemented, too soon to assess goals progression <30days   [] Goals require adjustment due to lack of progress  [] Patient is not progressing as expected and requires additional follow up with physician  [] Other    Persisting Functional Limitations/Impairments:  [x]Sitting [x]Standing   [x]Walking [x]Stairs   [x]Transfers [x]ADLs   [x]Squatting/bending [x]Kneeling  [x]Housework [x]Job related tasks  []Driving [x]Sports/Recreation   [x]Sleeping []Other:    ASSESSMENT: Pt making good progress and responding well to hawk , continues to have some weakness in lower extremity, L calf . Pt going on vacation for a week and will be back for treatment. Pt is making good progress towards remaining goals.       Treatment/Activity Tolerance:  [x] Pt able to complete treatment [] Patient limited by bruno  [] Patient limited by pain  [] Patient limited by other medical complications  [] Other:     Prognosis:  [x] Good [] Fair  [] Poor    Patient Requires Follow-up: [x] Yes  [] No        PLAN: See dominique. PT 1-2x / week for 6 weeks. [x] Continue per plan of care [] Alter current plan (see comments)  [] Plan of care initiated [] Hold pending MD visit [] Discharge    Electronically signed by: Christopher Fletcher PT, DPT        Note: If patient does not return for scheduled/ recommended follow up visits, this note will serve as a discharge from care along with most recent update on progress.

## 2022-06-22 ENCOUNTER — HOSPITAL ENCOUNTER (OUTPATIENT)
Dept: PHYSICAL THERAPY | Age: 42
Setting detail: THERAPIES SERIES
Discharge: HOME | End: 2022-06-22
Payer: COMMERCIAL

## 2022-06-22 PROCEDURE — 97140 MANUAL THERAPY 1/> REGIONS: CPT

## 2022-06-22 PROCEDURE — 97112 NEUROMUSCULAR REEDUCATION: CPT

## 2022-06-22 PROCEDURE — 97530 THERAPEUTIC ACTIVITIES: CPT

## 2022-06-22 NOTE — FLOWSHEET NOTE
Smith Chung  Phone: [phone]   Fax: [phone]      Physical Therapy Treatment Note/ Progress Report:     Date:  2022    Patient Name:  Chandrakant Licona    :  1980  MRN: [id number]  Restrictions/Precautions:    Medical/Treatment Diagnosis Information:         U43.376Q (ICD-10-CM) - Strain of other muscle(s) and tendon(s) of posterior muscle group at lower leg level, left leg, initial encounter  Treatment Diagnosis: Impairments of L calf pain with muscle tension, , L ankle ROM, gait, balance. Insurance/Certification information:  PT Insurance Information: [employer] [place]  Physician Information:  Referring Practitioner:  Dr. Garrett Zaman of care signed (Y/N): []  Yes [x]  No     Date of Patient follow up with Physician:      Progress Report: []  Yes  [x]  No     Date Range for reporting period:  Beginning: 3/24/22  Re-eval:   PN:     Progress report due (10 Rx/or 30 days whichever is less):     Recertification due (POC duration/ or 90 days whichever is less):  22    Visit # Insurance Allowable Auth required? Date Range   2+6 20 []  Yes  [x]  No        Latex Allergy:  [x]NO      []YES  Preferred Language for Healthcare:   [x]English       []other:    Functional Scale:        Date assessed:  FOTO: raw score = 31; risk adjusted = 53   3/24/22  FOTO = raw score = 63; risk adjusted = 65                      22  FOTO - 75                                                                            22    Pain level: 1/10       SUBJECTIVE:  good trip to [place] , worked out and did some hikes out there with minimal issues. After he got home after flight , calf was hurting some and tight.      OBJECTIVE:      -  MMT - 4+/5 L calf    PROM AROM     L R L R   Knee Flexion     WNL wnl   Knee Extension     0 0   Dorsiflexion      5 15   Plantarflexion     55 50   Inversion      41 40   Eversion       - no TTP, tight gastroc with adhesion/nodules noted in muscle belly, pes planus with SLS on L with decreased ankle/foot control. RESTRICTIONS/PRECAUTIONS: WBAT    Exercises/Interventions:     Therapeutic Exercise (33185)  Resistance / level Sets/sec Reps Notes / Cues   bike 2.0 5'     IB gastroc  Soleus   2  2 30''  30''    Standing  B HR   2 10           Arch raises      Towel scrunches      Wall sits      Leg press       Soleus press on quantum machine  40# 2 10           SL heel raise off step   1 10           Glider rev lunge  2 10                                Therapeutic Activities (60793)                                   Neuromuscular Re-ed (14762)       airex       BAPS board       bosu lunge  bosu rocks  bosu squats   5''  x30 20    TG 2 up 1 down ecc   2 10    Lateral band walks ( laces )   20'  4    RUNNERS SL RDL  2 10    RFESS  2 10    Manual Intervention (20067)       Knee mobs/PROM       Tib/Fem Mobs       Patella Mobs       Ankle mobs        hawk  to L gastroc/soleus   x11'                Modalities:     Pt. Education:  -pt educated on diagnosis, prognosis and expectations for rehab  -all pt questions were answered    Home Exercise Program:    Access Code: [email or web address]  URL: [employer]/  Date: 05/18/2022  Prepared by: Lor Griffith    Exercises - completed 5/18 . Blue TB issued   Gastroc Stretch on Wall - 1 x daily - 7 x weekly - 3 sets - 10 reps  Soleus Stretch on Wall - 1 x daily - 7 x weekly - 3 sets - 10 reps  Seated Heel Raise - 1 x daily - 7 x weekly - 3 sets - 10 reps  Seated Heel Raise - 1 x daily - 7 x weekly - 3 sets - 10 reps  Ankle and Toe Plantarflexion with Resistance - 1 x daily - 7 x weekly - 3 sets - 10 reps    Access Code: [email or web address]  URL: [employer]/  Date: 05/25/2022  Prepared by: Lor Griffith    Exercises  Towel Scrunches - 1 x daily - 7 x weekly - 3 sets - 10 reps  Arch Lifting - 1 x daily - 7 x weekly - 3 sets - 10 reps  Standing Heel Raise - 1 x daily - 7 x weekly - 3 sets - 10 reps  Squat on Flat Side of BOSU® - 1 x daily - 7 x weekly - 3 sets - 10 reps  Lunge Onto BOSU® Ball - 1 x daily - 7 x weekly - 3 sets - 10 reps    Access Code: [email or web address]  URL: [email or web address]  Date: 06/22/2022  Prepared by: Zurdo Marion    Exercises  Single Leg Deadlift with Kettlebell - 1 x daily - 7 x weekly - 3 sets - 10 reps  Single Leg Lunge with Foot on Bench - 1 x daily - 7 x weekly - 3 sets - 10 reps  Standing Single Leg Heel Raise - 1 x daily - 7 x weekly - 3 sets - 10 reps        Therapeutic Exercise and NMR EXR  [x] (83235) Provided verbal/tactile cueing for activities related to strengthening, flexibility, endurance, ROM for improvements in LE, proximal hip, and core control with self care, mobility, lifting, ambulation. [x] (80558) Provided verbal/tactile cueing for activities related to improving balance, coordination, kinesthetic sense, posture, motor skill, proprioception  to assist with LE, proximal hip, and core control in self care, mobility, lifting, ambulation and eccentric single leg control.   [] (49549) Therapist is in constant attendance of 2 or more patients providing skilled therapy interventions, but not providing any significant amount of measurable one-on-one time to either patient, for improvements in LE, proximal hip, and core control in self care, mobility, lifting, ambulation and eccentric single leg control.      NMR and Therapeutic Activities:    [x] (81876 or 53113) Provided verbal/tactile cueing for activities related to improving balance, coordination, kinesthetic sense, posture, motor skill, proprioception and motor activation to allow for proper function of core, proximal hip and LE with self care and ADLs  [x] (58995) Gait Re-education- Provided training and instruction to the patient for proper LE, core and proximal hip recruitment and positioning and eccentric body weight control with ambulation re-education including up and down stairs     Home Exercise Program:    [] (35918) Reviewed/Progressed HEP activities related to strengthening, flexibility, endurance, ROM of core, proximal hip and LE for functional self-care, mobility, lifting and ambulation/stair navigation   [] (46911)Reviewed/Progressed HEP activities related to improving balance, coordination, kinesthetic sense, posture, motor skill, proprioception of core, proximal hip and LE for self care, mobility, lifting, and ambulation/stair navigation      Manual Treatments:  PROM / STM / Oscillations-Mobs:  G-I, II, III, IV (PA's, Inf., Post.)  [x] (91238) Provided manual therapy to mobilize LE, proximal hip and/or LS spine soft tissue/joints for the purpose of modulating pain, promoting relaxation,  increasing ROM, reducing/eliminating soft tissue swelling/inflammation/restriction, improving soft tissue extensibility and allowing for proper ROM for normal function with self care, mobility, lifting and ambulation. Modalities:  [] (42162) Vasopneumatic compression: Utilized vasopneumatic compression to decrease edema / swelling for the purpose of improving mobility and quad tone / recruitment which will allow for increased overall function including but not limited to self-care, transfers, ambulation, and ascending / descending stairs.        Charges:  Timed Code Treatment Minutes: 45   Total Treatment Minutes: 45     [] EVAL - LOW (27016)   [] EVAL - MOD (44182)  [] EVAL - HIGH (04484)  [] RE-EVAL (00249)   [] IO(09768) x 1    [] Ionto  [x] NMR (11528) x 1       [] Vaso  [x] Manual (50126) x  1     [] Ultrasound  [x] TA x 1       [] Mech Traction (39549)  [] Aquatic Therapy x      [] ES (un) (16134):   [] Home Management Training x  [] ES(attended) (73033)   [] Dry Needling 1-2 muscles (27299):  [] Dry Needling 3+ muscles (421550  [] Group:      [] Other:     GOALS:  Patient stated goal: return to jogging, working out   [x] Progressing: [] Met: [] Not Met: [] Adjusted    Therapist goals for Patient:   Short Term Goals: To be achieved in: 2 weeks  1. Independent in HEP and progression per patient tolerance, in order to prevent re-injury. [x] Progressing: [] Met: [] Not Met: [] Adjusted  2. Patient will have a decrease in pain to facilitate improvement in movement, function, and ADLs as indicated by Functional Deficits. [x] Progressing: [] Met: [] Not Met: [] Adjusted    Long Term Goals: To be achieved in: 6 weeks  1. FOTO raw score of 80 or greater to assist with reaching prior level of function. [x] Progressing: [] Met: [] Not Met: [x] Adjusted  2. Patient will demonstrate increased L ankle dorsiflexion AROM to 10 to allow for proper joint functioning as indicated by patients Functional Deficits. [x] Progressing: [] Met: [] Not Met: [] Adjusted  3. Patient will demonstrate an increase in global LLE Strength to at least 5/5 as well as good proximal hip strength and control to allow for proper functional mobility as indicated by patients Functional Deficits. [x] Progressing: [] Met: [] Not Met: [x] Adjusted  4. Patient will return to functional activities including ambulating with normalized gait pattern without increased symptoms or restriction. [x] Progressing: [] Met: [] Not Met: [] Adjusted  5. Patient will begin return to jogging progression demonstrating ability to jog 20 minutes continuously without an increase in symptoms. [x] Progressing: [] Met: [] Not Met: [] Adjusted     Overall Progression Towards Functional goals/ Treatment Progress Update:  [x] Patient is progressing as expected towards functional goals listed. [] Progression is slowed due to complexities/Impairments listed. [] Progression has been slowed due to co-morbidities.   [] Plan just implemented, too soon to assess goals progression <30days   [] Goals require adjustment due to lack of progress  [] Patient is not progressing as expected and requires additional follow up with physician  [] Other    Persisting Functional Limitations/Impairments:  [x]Sitting [x]Standing   [x]Walking [x]Stairs   [x]Transfers [x]ADLs   [x]Squatting/bending [x]Kneeling  [x]Housework [x]Job related tasks  []Driving [x]Sports/Recreation   [x]Sleeping []Other:    ASSESSMENT: progress note completed today, improvements noted in L ankle ROM and strength which has demonstrated improve FOTO score as well for function. Pt making good progress and responding well to hawk , continues to have some weakness in lower extremity, L calf . Pt is making good progress towards remaining goals. PT to continue for a few visits with focus on transition to HEP. Treatment/Activity Tolerance:  [x] Pt able to complete treatment [] Patient limited by fatique  [] Patient limited by pain  [] Patient limited by other medical complications  [] Other:     Prognosis:  [x] Good [] Fair  [] Poor    Patient Requires Follow-up: [x] Yes  [] No        PLAN: See eval. PT 1-2x / week for 6 weeks. [x] Continue per plan of care [] Alter current plan (see comments)  [] Plan of care initiated [] Hold pending MD visit [] Discharge    Electronically signed by: Juan Phipps, PT, DPT        Note: If patient does not return for scheduled/ recommended follow up visits, this note will serve as a discharge from care along with most recent update on progress.

## 2022-06-29 ENCOUNTER — APPOINTMENT (OUTPATIENT)
Dept: PHYSICAL THERAPY | Age: 42
End: 2022-06-29
Payer: COMMERCIAL

## 2022-07-06 ENCOUNTER — APPOINTMENT (OUTPATIENT)
Dept: PHYSICAL THERAPY | Age: 42
End: 2022-07-06
Payer: COMMERCIAL

## 2022-07-13 ENCOUNTER — HOSPITAL ENCOUNTER (OUTPATIENT)
Dept: PHYSICAL THERAPY | Age: 42
Setting detail: THERAPIES SERIES
Discharge: HOME | End: 2022-07-13
Payer: COMMERCIAL

## 2022-07-13 PROCEDURE — 97112 NEUROMUSCULAR REEDUCATION: CPT

## 2022-07-13 PROCEDURE — 97530 THERAPEUTIC ACTIVITIES: CPT

## 2022-07-13 PROCEDURE — 97140 MANUAL THERAPY 1/> REGIONS: CPT

## 2022-07-13 NOTE — FLOWSHEET NOTE
Smith Chung  Phone: [phone]   Fax: [phone]      Physical Therapy Treatment Note/ Progress Report:     Date:  2022    Patient Name:  Dwayne Nuno    :  1980  MRN: [id number]  Restrictions/Precautions:    Medical/Treatment Diagnosis Information:         G57.646S (ICD-10-CM) - Strain of other muscle(s) and tendon(s) of posterior muscle group at lower leg level, left leg, initial encounter  Treatment Diagnosis: Impairments of L calf pain with muscle tension, , L ankle ROM, gait, balance. Insurance/Certification information:  PT Insurance Information: [employer] [place]  Physician Information:  Referring Practitioner:  Dr. Bessie Donaldson of care signed (Y/N): []  Yes [x]  No     Date of Patient follow up with Physician:      Progress Report: []  Yes  [x]  No     Date Range for reporting period:  Beginning: 3/24/22  Re-eval:   PN:     Progress report due (10 Rx/or 30 days whichever is less):     Recertification due (POC duration/ or 90 days whichever is less):  22    Visit # Insurance Allowable Auth required? Date Range   2+7 20 []  Yes  [x]  No        Latex Allergy:  [x]NO      []YES  Preferred Language for Healthcare:   [x]English       []other:    Functional Scale:        Date assessed:  FOTO: raw score = 31; risk adjusted = 53   3/24/22  FOTO = raw score = 63; risk adjusted = 65                      22  FOTO - 75                                                                            22    Pain level: 1/10       SUBJECTIVE:  Pt states L calf/gastroc is doing well overall with primary c/o being intermittent jabs of medial pain for no specific reason. Pt reports walking/jogging for a few miles a couple of times since the last PT session without issue.  Pt feels L calf is around 85% of normal function when compared to R.     OBJECTIVE:      -  MMT - 4+/5 L calf    PROM AROM     L R L R   Knee Flexion     WNL wnl   Knee Extension     0 0   Dorsiflexion      10 15   Plantarflexion     55 50   Inversion      41 40   Eversion      22 17   5/18 - no TTP, tight gastroc with adhesion/nodules noted in muscle belly, pes planus with SLS on L with decreased ankle/foot control. RESTRICTIONS/PRECAUTIONS: WBAT    Exercises/Interventions:     Therapeutic Exercise (73495)  Resistance / level Sets/sec Reps Notes / Cues   bike 2.0 5'     IB gastroc  Soleus   2  2 30''  30''    Standing  B HR   3 10           Arch raises      Towel scrunches      Wall sits      Leg press       Soleus press on quantum machine  40# 2 10           SL heel raise off step   1/1 10/5           Glider rev lunge  2 10                                Therapeutic Activities (48657)                                   Neuromuscular Re-ed (27101)       airex       BAPS board       bosu lunge  bosu rocks  bosu squats   5''  x30 20    TG 2 up 1 down ecc   2 10    Lateral band walks ( laces )   20'  4    RUNNERS SL RDL  2 10    RFESS  2 10    Manual Intervention (04169)       Knee mobs/PROM       Tib/Fem Mobs       Patella Mobs       Ankle mobs        hawk  to L gastroc/soleus   x11'                Modalities:     Pt. Education:  -pt educated on diagnosis, prognosis and expectations for rehab  -all pt questions were answered    Home Exercise Program:    Access Code: [email or web address]  URL: [employer]/  Date: 05/18/2022  Prepared by: Deepika Prince    Exercises - completed 5/18 . Blue TB issued   Gastroc Stretch on Wall - 1 x daily - 7 x weekly - 3 sets - 10 reps  Soleus Stretch on Wall - 1 x daily - 7 x weekly - 3 sets - 10 reps  Seated Heel Raise - 1 x daily - 7 x weekly - 3 sets - 10 reps  Seated Heel Raise - 1 x daily - 7 x weekly - 3 sets - 10 reps  Ankle and Toe Plantarflexion with Resistance - 1 x daily - 7 x weekly - 3 sets - 10 reps    Access Code: [email or web address]  URL: [employer]/  Date: 05/25/2022  Prepared by: Shashi Gonzalez Miladis    Exercises  Towel Scrunches - 1 x daily - 7 x weekly - 3 sets - 10 reps  Arch Lifting - 1 x daily - 7 x weekly - 3 sets - 10 reps  Standing Heel Raise - 1 x daily - 7 x weekly - 3 sets - 10 reps  Squat on Flat Side of BOSU® - 1 x daily - 7 x weekly - 3 sets - 10 reps  Lunge Onto BOSU® Ball - 1 x daily - 7 x weekly - 3 sets - 10 reps    Access Code: [email or web address]  URL: [email or web address]  Date: 06/22/2022  Prepared by: Aaliyah Rivero    Exercises  Single Leg Deadlift with Kettlebell - 1 x daily - 7 x weekly - 3 sets - 10 reps  Single Leg Lunge with Foot on Bench - 1 x daily - 7 x weekly - 3 sets - 10 reps  Standing Single Leg Heel Raise - 1 x daily - 7 x weekly - 3 sets - 10 reps        Therapeutic Exercise and NMR EXR  [x] (21743) Provided verbal/tactile cueing for activities related to strengthening, flexibility, endurance, ROM for improvements in LE, proximal hip, and core control with self care, mobility, lifting, ambulation. [x] (55924) Provided verbal/tactile cueing for activities related to improving balance, coordination, kinesthetic sense, posture, motor skill, proprioception  to assist with LE, proximal hip, and core control in self care, mobility, lifting, ambulation and eccentric single leg control.   [] (11842) Therapist is in constant attendance of 2 or more patients providing skilled therapy interventions, but not providing any significant amount of measurable one-on-one time to either patient, for improvements in LE, proximal hip, and core control in self care, mobility, lifting, ambulation and eccentric single leg control.      NMR and Therapeutic Activities:    [x] (91343 or 26629) Provided verbal/tactile cueing for activities related to improving balance, coordination, kinesthetic sense, posture, motor skill, proprioception and motor activation to allow for proper function of core, proximal hip and LE with self care and ADLs  [x] (79181) Gait Re-education- Provided training and instruction to the patient for proper LE, core and proximal hip recruitment and positioning and eccentric body weight control with ambulation re-education including up and down stairs     Home Exercise Program:    [] (00777) Reviewed/Progressed HEP activities related to strengthening, flexibility, endurance, ROM of core, proximal hip and LE for functional self-care, mobility, lifting and ambulation/stair navigation   [] (86208)Reviewed/Progressed HEP activities related to improving balance, coordination, kinesthetic sense, posture, motor skill, proprioception of core, proximal hip and LE for self care, mobility, lifting, and ambulation/stair navigation      Manual Treatments:  PROM / STM / Oscillations-Mobs:  G-I, II, III, IV (PA's, Inf., Post.)  [x] (43200) Provided manual therapy to mobilize LE, proximal hip and/or LS spine soft tissue/joints for the purpose of modulating pain, promoting relaxation,  increasing ROM, reducing/eliminating soft tissue swelling/inflammation/restriction, improving soft tissue extensibility and allowing for proper ROM for normal function with self care, mobility, lifting and ambulation. Modalities:  [] (06160) Vasopneumatic compression: Utilized vasopneumatic compression to decrease edema / swelling for the purpose of improving mobility and quad tone / recruitment which will allow for increased overall function including but not limited to self-care, transfers, ambulation, and ascending / descending stairs.        Charges:  Timed Code Treatment Minutes: 45   Total Treatment Minutes: 45     [] EVAL - LOW (36666)   [] EVAL - MOD (13151)  [] EVAL - HIGH (79147)  [] RE-EVAL (43332)   [] AG(07590) x 1    [] Ionto  [x] NMR (51842) x 1       [] Vaso  [x] Manual (96336) x  1     [] Ultrasound  [x] TA x 1       [] Mech Traction (96982)  [] Aquatic Therapy x      [] ES (un) (82196):   [] Home Management Training x  [] ES(attended) (59388)   [] Dry Needling 1-2 muscles (94655):  [] Dry Needling 3+ muscles (641493  [] Group:      [] Other:     GOALS:  Patient stated goal: return to jogging, working out   [x] Progressing: [] Met: [] Not Met: [] Adjusted    Therapist goals for Patient:   Short Term Goals: To be achieved in: 2 weeks  1. Independent in HEP and progression per patient tolerance, in order to prevent re-injury. [x] Progressing: [] Met: [] Not Met: [] Adjusted  2. Patient will have a decrease in pain to facilitate improvement in movement, function, and ADLs as indicated by Functional Deficits. [x] Progressing: [] Met: [] Not Met: [] Adjusted    Long Term Goals: To be achieved in: 6 weeks  1. FOTO raw score of 80 or greater to assist with reaching prior level of function. [x] Progressing: [] Met: [] Not Met: [x] Adjusted  2. Patient will demonstrate increased L ankle dorsiflexion AROM to 10 to allow for proper joint functioning as indicated by patients Functional Deficits. [x] Progressing: [] Met: [] Not Met: [] Adjusted  3. Patient will demonstrate an increase in global LLE Strength to at least 5/5 as well as good proximal hip strength and control to allow for proper functional mobility as indicated by patients Functional Deficits. [x] Progressing: [] Met: [] Not Met: [x] Adjusted  4. Patient will return to functional activities including ambulating with normalized gait pattern without increased symptoms or restriction. [x] Progressing: [] Met: [] Not Met: [] Adjusted  5. Patient will begin return to jogging progression demonstrating ability to jog 20 minutes continuously without an increase in symptoms. [x] Progressing: [] Met: [] Not Met: [] Adjusted     Overall Progression Towards Functional goals/ Treatment Progress Update:  [x] Patient is progressing as expected towards functional goals listed. [] Progression is slowed due to complexities/Impairments listed. [] Progression has been slowed due to co-morbidities.   [] Plan just implemented, too soon to assess goals progression <30days   [] Goals require adjustment due to lack of progress  [] Patient is not progressing as expected and requires additional follow up with physician  [] Other    Persisting Functional Limitations/Impairments:  [x]Sitting [x]Standing   [x]Walking [x]Stairs   [x]Transfers [x]ADLs   [x]Squatting/bending [x]Kneeling  [x]Housework [x]Job related tasks  []Driving [x]Sports/Recreation   [x]Sleeping []Other:    ASSESSMENT:  Pt demonstrates normal L ankle/knee/hip kinematics through all prescribed exercises. Pt is completing entire routine without aggravating L gastroc muscle. Pt noted immediate drop in calf muscle tightness and discomfort following manual soft tissue release. Pt is demonstrating good carry over with exercises and self mobilization techniques. Pt's AROM of L ankle has progressed to WFL's in every plane of motion. Additionally, pt's L LL strength had improved to WNL's upon MMT. Pt is making very good progress towards remaining goals. Treatment/Activity Tolerance:  [x] Pt able to complete treatment [] Patient limited by fatique  [] Patient limited by pain  [] Patient limited by other medical complications  [] Other:     Prognosis:  [x] Good [] Fair  [] Poor    Patient Requires Follow-up: [x] Yes  [] No        PLAN: See eval. PT 1-2x / week for 6 weeks. [x] Continue per plan of care [] Alter current plan (see comments)  [] Plan of care initiated [] Hold pending MD visit [] Discharge    Electronically signed by: Gerson Mahoney PTA, ATC        Note: If patient does not return for scheduled/ recommended follow up visits, this note will serve as a discharge from care along with most recent update on progress.

## 2022-10-04 NOTE — PROGRESS NOTES
CHIEF COMPLAINT: Left ankle pain. DATE OF INJURY: 3/17/22    History:  Mr. Tomasa Bill 39 y.o. male presents today for left ankle follow-up. Initial history: to after hours clinic for evaluation of a left calf injury. The injury occurred 2 days ago when he was jumping over a wave in [place]. When he landed he felt immediate calf pain. He is complaining of medial gastrocnemius pain. There is no pain at the achilles. He has not yet been evaluated for the pain. He states [place] lim[place] through the airport as he can hardly bear weight on the left leg. He rates pain 9/10. Pain increases with walking and decreases with rest and elevation. No numbness or tingling sensation, and no other complaints. He has been using tylenol for pain control as he cannot take NSAID's due to history of ulcerative colitis. He presents today non weight bearing on crutches. Interval History: He was doing better. However he did go swimming, but states that he was try to use mostly his upper body though, not using a leg buoy. He then went to a party later that night. He then noted some increased swelling and some discomfort at his calf again. He was initially seen in after-hours clinic by BILL Garcia . MRI was previously denied by insurance because of lack of nonoperative treatment. He has been to 2 visits of physical therapy at the [id number] [place] [id number],[address] office. He had been doing better, but then was noted to have more discoloration and pain at his calf. Past Medical History:   Diagnosis Date    Asthma     UC (ulcerative colitis confined to rectum) ([facility] [address]        No past surgical history on file.     Current Outpatient Medications on File Prior to Visit   Medication Sig Dispense Refill    inFLIXimab (REMICADE) 100 MG injection Infuse 5 mg/kg intravenously See Admin Instructions      cetirizine (ZYRTEC) 10 MG tablet Take 10 mg by mouth daily      predniSONE (DELTASONE) 20 MG tablet Take 2 tablets by mouth daily 14 tablet 0    Left message for pt, need to inquire about orders and if he is continuing with neuro.    albuterol sulfate  (90 BASE) MCG/ACT inhaler Inhale 2 puffs into the lungs every 4 hours as needed for Wheezing or Shortness of Breath Use inhaler with a spacer device 2 Inhaler 5    acetaminophen (TYLENOL) 500 MG tablet Take 2 tablets by mouth every 6 hours as needed for Pain 225 tablet 3     No current facility-administered medications on file prior to visit. Allergies   Allergen Reactions    Naproxen     Penicillins Other (See Comments)    Zithromax [Azithromycin]        Social History     Socioeconomic History    Marital status:      Spouse name: Not on file    Number of children: Not on file    Years of education: Not on file    Highest education level: Not on file   Occupational History    Not on file   Tobacco Use    Smoking status: Current Every Day Smoker     Packs/day: 1.00     Years: 15.00     Pack years: 15.00     Types: Cigarettes, E-Cigarettes    Smokeless tobacco: Never Used   Substance and Sexual Activity    Alcohol use: Yes     Comment: rarely    Drug use: Yes     Comment: 7yrs ago or more    Sexual activity: Yes     Partners: Female     Birth control/protection: Pill   Other Topics Concern    Not on file   Social History Narrative    Not on file     Social Determinants of Health     Financial Resource Strain:     Difficulty of Paying Living Expenses: Not on file   Food Insecurity:     Worried About Running Out of Food in the Last Year: Not on file    Halle of Food in the Last Year: Not on file   Transportation Needs:     Lack of Transportation (Medical): Not on file    Lack of Transportation (Non-Medical):  Not on file   Physical Activity:     Days of Exercise per Week: Not on file    Minutes of Exercise per Session: Not on file   Stress:     Feeling of Stress : Not on file   Social Connections:     Frequency of Communication with Friends and Family: Not on file    Frequency of Social Gatherings with Friends and Family: Not on file    Attends [employer] Services: Not on file    Active Member of Clubs or Organizations: Not on file    Attends Club or Organization Meetings: Not on file    Marital Status: Not on file   Intimate Partner Violence:     Fear of Current or Ex-Partner: Not on file    Emotionally Abused: Not on file    Physically Abused: Not on file    Sexually Abused: Not on file   Housing Stability:     Unable to Pay for Housing in the Last Year: Not on file    Number of [place] in the Last Year: Not on file    Unstable Housing in the Last Year: Not on file       No family history on file. Review of Systems:  I have reviewed the clinically relevant past medical history, medications, allergies, family history, social history, and 13 point Review of Systems from the patient's recent history form & documented any details relevant to today's presenting complaints in the history above. The patient's self-reported past medical history, medications, allergies, family history, social history, and Review of Systems form from 3/19/22 have been scanned into the chart under the \"Media\" tab. Physical Examination:  Mr. Víctor Davis is a 39 y.o. male who presents today in no acute distress, awake, alert, and oriented. Ht 5' 10\" (1.778 m)   Wt 205 lb (93 kg)   BMI 29.41 kg/m²       Examination of left lower extremity demonstrates no obvious deformity. Left ankle ROM 10 (dorsiflexion) - 40 (plantarflexion), right ankle 30-50. There is mild to moderate swelling that can be seen in left medial gastrocnemius, no swelling at the ankle. He has good strength in dorsiflexion, eversion, inversion. Plantarflexion strength is decreased. There is mild to moderate discomfort with palpation at the medial gastrocnemius. At this point, I cannot feel a palpable defect, though I do possibly feel some scar tissue.     IMAGING:  Left tibia fibula xrays 3/19/22: There is lucency in the soft tissue of the gastrocnemius muscle around mid-belly Impression:   Left medial gastrocnemius partial vs complete tear      Plan:   Discussed with patient that I do suspect that he had a medial gastroc tear. Discussed that this is usually treated nonoperatively. Thus I do not think an MRI would really change medical decision making. Procedures    Breg Tall Juli Walking Boot     Patient was prescribed a Breg Tall Juli Walking Boot. The left gatroc/ankle will require stabilization / immobilization from this semi-rigid / rigid orthosis to improve their function. The orthosis will assist in protecting the affected area, provide functional support and facilitate healing. Patient was instructed to progress ambulation weight bearing as tolerated in the device. The patient was educated and fit by a healthcare professional with expert knowledge and specialization in brace application while under the direct supervision of the physician. Verbal and written instructions for the use of and application of this item were provided. They were instructed to contact the office immediately should the brace result in increased pain, decreased sensation, increased swelling or worsening of the condition. He can take the boot off when not weightbearing. I want him to come out of the boot for ankle dorsiflexion and plantarflexion. Ice/heat as needed. Hold PT for now. He will follow-up in 4 weeks. [facility]. Karen Elmore MD  Orthopaedic Surgery and Sports Medicine     Disclaimer: This note was generated with use of a verbal recognition program and an attempt was made to check for errors. It is possible that there are still dictated errors within this office note. If so, please bring any significant errors to my attention for an addendum. All efforts were made to ensure that this office note is accurate.

## 2024-05-05 ENCOUNTER — OFFICE VISIT (OUTPATIENT)
Age: 44
End: 2024-05-05

## 2024-05-05 VITALS
SYSTOLIC BLOOD PRESSURE: 138 MMHG | HEART RATE: 57 BPM | TEMPERATURE: 97.5 F | DIASTOLIC BLOOD PRESSURE: 89 MMHG | OXYGEN SATURATION: 98 % | HEIGHT: 70 IN | WEIGHT: 162 LBS | BODY MASS INDEX: 23.19 KG/M2

## 2024-05-05 DIAGNOSIS — S22.31XA CLOSED FRACTURE OF ONE RIB OF RIGHT SIDE, INITIAL ENCOUNTER: Primary | ICD-10-CM

## 2024-05-05 DIAGNOSIS — S29.9XXA RIB INJURY: ICD-10-CM

## 2024-05-05 RX ORDER — UPADACITINIB 15 MG/1
TABLET, EXTENDED RELEASE ORAL
COMMUNITY

## 2024-05-05 RX ORDER — DEXMETHYLPHENIDATE HYDROCHLORIDE 5 MG/1
5 CAPSULE, EXTENDED RELEASE ORAL
COMMUNITY